# Patient Record
Sex: MALE | Race: BLACK OR AFRICAN AMERICAN | NOT HISPANIC OR LATINO | URBAN - METROPOLITAN AREA
[De-identification: names, ages, dates, MRNs, and addresses within clinical notes are randomized per-mention and may not be internally consistent; named-entity substitution may affect disease eponyms.]

---

## 2017-04-01 ENCOUNTER — EMERGENCY (EMERGENCY)
Facility: HOSPITAL | Age: 37
LOS: 1 days | Discharge: HOME | End: 2017-04-01
Attending: EMERGENCY MEDICINE
Payer: COMMERCIAL

## 2017-04-01 VITALS — TEMPERATURE: 98 F | HEART RATE: 69 BPM | OXYGEN SATURATION: 100 % | RESPIRATION RATE: 16 BRPM

## 2017-04-01 DIAGNOSIS — R19.7 DIARRHEA, UNSPECIFIED: ICD-10-CM

## 2017-04-01 DIAGNOSIS — R10.9 UNSPECIFIED ABDOMINAL PAIN: ICD-10-CM

## 2017-04-01 LAB
ALBUMIN SERPL ELPH-MCNC: 3.9 G/DL — SIGNIFICANT CHANGE UP (ref 3.3–5.2)
ALP SERPL-CCNC: 60 U/L — SIGNIFICANT CHANGE UP (ref 40–120)
ALT FLD-CCNC: 18 U/L — SIGNIFICANT CHANGE UP
ANION GAP SERPL CALC-SCNC: 8 MMOL/L — SIGNIFICANT CHANGE UP (ref 5–17)
AST SERPL-CCNC: 20 U/L — SIGNIFICANT CHANGE UP
BASOPHILS # BLD AUTO: 0.1 K/UL — SIGNIFICANT CHANGE UP (ref 0–0.2)
BASOPHILS NFR BLD AUTO: 1.9 % — SIGNIFICANT CHANGE UP (ref 0–2)
BILIRUB SERPL-MCNC: 0.2 MG/DL — LOW (ref 0.4–2)
BUN SERPL-MCNC: 14 MG/DL — SIGNIFICANT CHANGE UP (ref 8–20)
CALCIUM SERPL-MCNC: 9 MG/DL — SIGNIFICANT CHANGE UP (ref 8.6–10.2)
CHLORIDE SERPL-SCNC: 101 MMOL/L — SIGNIFICANT CHANGE UP (ref 98–107)
CO2 SERPL-SCNC: 30 MMOL/L — HIGH (ref 22–29)
CREAT SERPL-MCNC: 0.91 MG/DL — SIGNIFICANT CHANGE UP (ref 0.5–1.3)
EOSINOPHIL # BLD AUTO: 0.1 K/UL — SIGNIFICANT CHANGE UP (ref 0–0.5)
EOSINOPHIL NFR BLD AUTO: 2.1 % — SIGNIFICANT CHANGE UP (ref 0–5)
GLUCOSE SERPL-MCNC: 94 MG/DL — SIGNIFICANT CHANGE UP (ref 70–115)
HCT VFR BLD CALC: 40.4 % — LOW (ref 42–52)
HGB BLD-MCNC: 13.8 G/DL — LOW (ref 14–18)
LIDOCAIN IGE QN: 30 U/L — SIGNIFICANT CHANGE UP (ref 22–51)
LYMPHOCYTES # BLD AUTO: 2.1 K/UL — SIGNIFICANT CHANGE UP (ref 1–4.8)
LYMPHOCYTES # BLD AUTO: 40.2 % — SIGNIFICANT CHANGE UP (ref 20–55)
MCHC RBC-ENTMCNC: 31.1 PG — HIGH (ref 27–31)
MCHC RBC-ENTMCNC: 34.2 G/DL — SIGNIFICANT CHANGE UP (ref 32–36)
MCV RBC AUTO: 91 FL — SIGNIFICANT CHANGE UP (ref 80–94)
MONOCYTES # BLD AUTO: 0.8 K/UL — SIGNIFICANT CHANGE UP (ref 0–0.8)
MONOCYTES NFR BLD AUTO: 15.2 % — HIGH (ref 3–10)
NEUTROPHILS # BLD AUTO: 2.1 K/UL — SIGNIFICANT CHANGE UP (ref 1.8–8)
NEUTROPHILS NFR BLD AUTO: 40 % — SIGNIFICANT CHANGE UP (ref 37–73)
PLATELET # BLD AUTO: 300 K/UL — SIGNIFICANT CHANGE UP (ref 150–400)
POTASSIUM SERPL-MCNC: 4.5 MMOL/L — SIGNIFICANT CHANGE UP (ref 3.5–5.3)
POTASSIUM SERPL-SCNC: 4.5 MMOL/L — SIGNIFICANT CHANGE UP (ref 3.5–5.3)
PROT SERPL-MCNC: 8.3 G/DL — SIGNIFICANT CHANGE UP (ref 6.6–8.7)
RBC # BLD: 4.44 M/UL — LOW (ref 4.6–6.2)
RBC # FLD: 14.6 % — SIGNIFICANT CHANGE UP (ref 11–15.6)
SODIUM SERPL-SCNC: 139 MMOL/L — SIGNIFICANT CHANGE UP (ref 135–145)
WBC # BLD: 5.3 K/UL — SIGNIFICANT CHANGE UP (ref 4.8–10.8)
WBC # FLD AUTO: 5.3 K/UL — SIGNIFICANT CHANGE UP (ref 4.8–10.8)

## 2017-04-01 PROCEDURE — 74177 CT ABD & PELVIS W/CONTRAST: CPT

## 2017-04-01 PROCEDURE — 99284 EMERGENCY DEPT VISIT MOD MDM: CPT

## 2017-04-01 PROCEDURE — 99284 EMERGENCY DEPT VISIT MOD MDM: CPT | Mod: 25

## 2017-04-01 PROCEDURE — 74177 CT ABD & PELVIS W/CONTRAST: CPT | Mod: 26

## 2017-04-01 PROCEDURE — 80053 COMPREHEN METABOLIC PANEL: CPT

## 2017-04-01 PROCEDURE — 85027 COMPLETE CBC AUTOMATED: CPT

## 2017-04-01 PROCEDURE — 83690 ASSAY OF LIPASE: CPT

## 2017-04-01 RX ORDER — SODIUM CHLORIDE 9 MG/ML
1000 INJECTION INTRAMUSCULAR; INTRAVENOUS; SUBCUTANEOUS
Qty: 0 | Refills: 0 | Status: DISCONTINUED | OUTPATIENT
Start: 2017-04-01 | End: 2017-04-05

## 2017-04-01 RX ADMIN — SODIUM CHLORIDE 500 MILLILITER(S): 9 INJECTION INTRAMUSCULAR; INTRAVENOUS; SUBCUTANEOUS at 15:29

## 2017-04-01 NOTE — ED STATDOCS - PROGRESS NOTE DETAILS
35 y/o non-verbal M presents complaining of intermittent diarrhea x 2 weeks and abdominal pain. Patient was seen by PMD for same and sent to ER for US. Will place in main ED for further evaluation 37 y/o M with down syndrome presents with father complaining of intermittent diarrhea x 2 weeks and abdominal pain. Patient was seen by PMD for same and sent to ER for US. Will place in main ED for further evaluation

## 2017-04-01 NOTE — ED ADULT NURSE NOTE - OBJECTIVE STATEMENT
Pt presents to ED with hx of Downs Syndrome, awake and alert with father at bedside. Father reports pt with diffuse abdominal pain with associated diarrhea x2 weeks. Father reports family at home being sick with cough, congestion. Pt denies any fever or chills. Denies any CP or SOB, respirations even and unlabored. Denies any bloody stools or hematuria. Pt in no apparent distress. will continue to monitor and reassess.

## 2017-04-01 NOTE — ED PROVIDER NOTE - PROGRESS NOTE DETAILS
Pt has had no further diarrhea here. Will need to f/u with PMD or GI for stool studies. Abd nontender.

## 2017-04-01 NOTE — ED PROVIDER NOTE - OBJECTIVE STATEMENT
Pt with hx of Down's Syndrome with 2 week hx of diarrhea. No fever or vomiting. ? abdominal pain. No recent antibiotics.

## 2017-04-01 NOTE — ED PROVIDER NOTE - NS ED ROS FT
Review of Systems:  	•	CONSTITUTIONAL : no weight loss  	•	SKIN : no rash  	•	HEMATOLOGIC : no petechia, no bruising  	•	EYES : no eye pain, no blurred vision  	•	ENT : no change in hearing, no sore throat  	•	RESPIRATORY : no shortness of breath, no cough  	•	CARDIAC : no chest pain, no palpitations  	•	GI : + diarrhea  	•	MUSCULOSKELETAL : no joint paint, no swelling, no redness  	•	NEUROLOGIC : MR

## 2017-12-20 ENCOUNTER — EMERGENCY (EMERGENCY)
Facility: HOSPITAL | Age: 37
LOS: 1 days | Discharge: HOME | End: 2017-12-20
Attending: EMERGENCY MEDICINE | Admitting: EMERGENCY MEDICINE
Payer: MEDICARE

## 2017-12-20 VITALS
SYSTOLIC BLOOD PRESSURE: 130 MMHG | RESPIRATION RATE: 18 BRPM | TEMPERATURE: 98 F | OXYGEN SATURATION: 95 % | HEART RATE: 120 BPM | DIASTOLIC BLOOD PRESSURE: 90 MMHG

## 2017-12-20 VITALS — HEIGHT: 60 IN | WEIGHT: 207.9 LBS

## 2017-12-20 LAB
ALBUMIN SERPL ELPH-MCNC: 3.8 G/DL — SIGNIFICANT CHANGE UP (ref 3.3–5.2)
ALP SERPL-CCNC: 68 U/L — SIGNIFICANT CHANGE UP (ref 40–120)
ALT FLD-CCNC: 39 U/L — SIGNIFICANT CHANGE UP
ANION GAP SERPL CALC-SCNC: 17 MMOL/L — SIGNIFICANT CHANGE UP (ref 5–17)
AST SERPL-CCNC: 27 U/L — SIGNIFICANT CHANGE UP
BASOPHILS NFR BLD AUTO: 1 % — SIGNIFICANT CHANGE UP (ref 0–2)
BILIRUB SERPL-MCNC: 0.3 MG/DL — LOW (ref 0.4–2)
BUN SERPL-MCNC: 19 MG/DL — SIGNIFICANT CHANGE UP (ref 8–20)
CALCIUM SERPL-MCNC: 9 MG/DL — SIGNIFICANT CHANGE UP (ref 8.6–10.2)
CHLORIDE SERPL-SCNC: 101 MMOL/L — SIGNIFICANT CHANGE UP (ref 98–107)
CO2 SERPL-SCNC: 23 MMOL/L — SIGNIFICANT CHANGE UP (ref 22–29)
CREAT SERPL-MCNC: 0.98 MG/DL — SIGNIFICANT CHANGE UP (ref 0.5–1.3)
GLUCOSE SERPL-MCNC: 93 MG/DL — SIGNIFICANT CHANGE UP (ref 70–115)
HCT VFR BLD CALC: 44.3 % — SIGNIFICANT CHANGE UP (ref 42–52)
HGB BLD-MCNC: 15.4 G/DL — SIGNIFICANT CHANGE UP (ref 14–18)
LIDOCAIN IGE QN: 21 U/L — LOW (ref 22–51)
LYMPHOCYTES # BLD AUTO: 5 % — LOW (ref 20–55)
MCHC RBC-ENTMCNC: 32.1 PG — HIGH (ref 27–31)
MCHC RBC-ENTMCNC: 34.8 G/DL — SIGNIFICANT CHANGE UP (ref 32–36)
MCV RBC AUTO: 92.3 FL — SIGNIFICANT CHANGE UP (ref 80–94)
MONOCYTES NFR BLD AUTO: 8 % — SIGNIFICANT CHANGE UP (ref 3–10)
NEUTROPHILS NFR BLD AUTO: 86 % — HIGH (ref 37–73)
PLAT MORPH BLD: NORMAL — SIGNIFICANT CHANGE UP
PLATELET # BLD AUTO: 281 K/UL — SIGNIFICANT CHANGE UP (ref 150–400)
POTASSIUM SERPL-MCNC: 4.4 MMOL/L — SIGNIFICANT CHANGE UP (ref 3.5–5.3)
POTASSIUM SERPL-SCNC: 4.4 MMOL/L — SIGNIFICANT CHANGE UP (ref 3.5–5.3)
PROT SERPL-MCNC: 8.5 G/DL — SIGNIFICANT CHANGE UP (ref 6.6–8.7)
RBC # BLD: 4.8 M/UL — SIGNIFICANT CHANGE UP (ref 4.6–6.2)
RBC # FLD: 14 % — SIGNIFICANT CHANGE UP (ref 11–15.6)
RBC BLD AUTO: NORMAL — SIGNIFICANT CHANGE UP
SODIUM SERPL-SCNC: 141 MMOL/L — SIGNIFICANT CHANGE UP (ref 135–145)
WBC # BLD: 12.9 K/UL — HIGH (ref 4.8–10.8)
WBC # FLD AUTO: 12.9 K/UL — HIGH (ref 4.8–10.8)

## 2017-12-20 PROCEDURE — 84100 ASSAY OF PHOSPHORUS: CPT

## 2017-12-20 PROCEDURE — 83690 ASSAY OF LIPASE: CPT

## 2017-12-20 PROCEDURE — 99284 EMERGENCY DEPT VISIT MOD MDM: CPT | Mod: 25

## 2017-12-20 PROCEDURE — 99284 EMERGENCY DEPT VISIT MOD MDM: CPT

## 2017-12-20 PROCEDURE — 74177 CT ABD & PELVIS W/CONTRAST: CPT

## 2017-12-20 PROCEDURE — 74177 CT ABD & PELVIS W/CONTRAST: CPT | Mod: 26

## 2017-12-20 PROCEDURE — 80053 COMPREHEN METABOLIC PANEL: CPT

## 2017-12-20 PROCEDURE — 96375 TX/PRO/DX INJ NEW DRUG ADDON: CPT

## 2017-12-20 PROCEDURE — 96374 THER/PROPH/DIAG INJ IV PUSH: CPT | Mod: XU

## 2017-12-20 PROCEDURE — 85027 COMPLETE CBC AUTOMATED: CPT

## 2017-12-20 PROCEDURE — 83735 ASSAY OF MAGNESIUM: CPT

## 2017-12-20 PROCEDURE — 36415 COLL VENOUS BLD VENIPUNCTURE: CPT

## 2017-12-20 RX ORDER — SODIUM CHLORIDE 9 MG/ML
3 INJECTION INTRAMUSCULAR; INTRAVENOUS; SUBCUTANEOUS ONCE
Qty: 0 | Refills: 0 | Status: COMPLETED | OUTPATIENT
Start: 2017-12-20 | End: 2017-12-20

## 2017-12-20 RX ORDER — ONDANSETRON 8 MG/1
4 TABLET, FILM COATED ORAL ONCE
Qty: 0 | Refills: 0 | Status: COMPLETED | OUTPATIENT
Start: 2017-12-20 | End: 2017-12-20

## 2017-12-20 RX ORDER — SODIUM CHLORIDE 9 MG/ML
1000 INJECTION INTRAMUSCULAR; INTRAVENOUS; SUBCUTANEOUS ONCE
Qty: 0 | Refills: 0 | Status: COMPLETED | OUTPATIENT
Start: 2017-12-20 | End: 2017-12-20

## 2017-12-20 RX ORDER — FAMOTIDINE 10 MG/ML
20 INJECTION INTRAVENOUS ONCE
Qty: 0 | Refills: 0 | Status: COMPLETED | OUTPATIENT
Start: 2017-12-20 | End: 2017-12-20

## 2017-12-20 RX ADMIN — FAMOTIDINE 20 MILLIGRAM(S): 10 INJECTION INTRAVENOUS at 21:13

## 2017-12-20 RX ADMIN — SODIUM CHLORIDE 1000 MILLILITER(S): 9 INJECTION INTRAMUSCULAR; INTRAVENOUS; SUBCUTANEOUS at 21:13

## 2017-12-20 RX ADMIN — SODIUM CHLORIDE 3 MILLILITER(S): 9 INJECTION INTRAMUSCULAR; INTRAVENOUS; SUBCUTANEOUS at 21:13

## 2017-12-20 RX ADMIN — ONDANSETRON 4 MILLIGRAM(S): 8 TABLET, FILM COATED ORAL at 21:13

## 2017-12-20 NOTE — ED ADULT NURSE NOTE - OBJECTIVE STATEMENT
Assumed pt care @ 2100. Pt received sitting on stretcher in NAD. Pt nonverbal as per father. Pt is here due to diarrhea and vomiting since today. Father states he was at his program and a few other people have the same symptoms, "he has the flu". Dad does state he got flu shot this year. Nonverbal indicator of abd pain. Dad unsure about fever. Lungs CTA, RR even unlabored. Abd soft & tender, + bowel sounds x 4quads.Skin warm, dry, color appropriate for age and race.

## 2017-12-20 NOTE — ED STATDOCS - PROGRESS NOTE DETAILS
Pt is 36 y/o M presents to ED c/o of constant ABD pain, diarrhea and vomiting with father at bedside. Per father, Pt has down syndrome and HLD. Potential sick contacts in care program. Will transfer to main for further evaluation.

## 2017-12-21 NOTE — ED PROVIDER NOTE - MEDICAL DECISION MAKING DETAILS
return to ed for intractable pain fever or unable to toelrat epo fluids father agrees to plan of care

## 2017-12-21 NOTE — ED PROVIDER NOTE - OBJECTIVE STATEMENT
36 y/o male with a hx of down syndrome presents to the ED BIB father with c/o abd pain. Father notes that pt has had multiple episodes of vomiting and diarrhea. Father states that pt's diarrhea is non-bloody and non-bilious. HPI and ROS is limited 2ndary to pt being non-verbal at baseline. No further complaints at this time.

## 2018-01-26 ENCOUNTER — EMERGENCY (EMERGENCY)
Facility: HOSPITAL | Age: 38
LOS: 1 days | Discharge: HOME | End: 2018-01-26
Attending: EMERGENCY MEDICINE
Payer: MEDICARE

## 2018-01-26 VITALS
HEART RATE: 59 BPM | WEIGHT: 209 LBS | TEMPERATURE: 98 F | RESPIRATION RATE: 20 BRPM | DIASTOLIC BLOOD PRESSURE: 96 MMHG | SYSTOLIC BLOOD PRESSURE: 138 MMHG | OXYGEN SATURATION: 100 %

## 2018-01-26 LAB
ALBUMIN SERPL ELPH-MCNC: 3.6 G/DL — SIGNIFICANT CHANGE UP (ref 3.3–5.2)
ALP SERPL-CCNC: 60 U/L — SIGNIFICANT CHANGE UP (ref 40–120)
ALT FLD-CCNC: 21 U/L — SIGNIFICANT CHANGE UP
ANION GAP SERPL CALC-SCNC: 9 MMOL/L — SIGNIFICANT CHANGE UP (ref 5–17)
AST SERPL-CCNC: 18 U/L — SIGNIFICANT CHANGE UP
BASOPHILS # BLD AUTO: 0.1 K/UL — SIGNIFICANT CHANGE UP (ref 0–0.2)
BASOPHILS NFR BLD AUTO: 1.6 % — SIGNIFICANT CHANGE UP (ref 0–2)
BILIRUB SERPL-MCNC: 0.3 MG/DL — LOW (ref 0.4–2)
BUN SERPL-MCNC: 14 MG/DL — SIGNIFICANT CHANGE UP (ref 8–20)
CALCIUM SERPL-MCNC: 9.1 MG/DL — SIGNIFICANT CHANGE UP (ref 8.6–10.2)
CHLORIDE SERPL-SCNC: 102 MMOL/L — SIGNIFICANT CHANGE UP (ref 98–107)
CO2 SERPL-SCNC: 27 MMOL/L — SIGNIFICANT CHANGE UP (ref 22–29)
CREAT SERPL-MCNC: 0.93 MG/DL — SIGNIFICANT CHANGE UP (ref 0.5–1.3)
EOSINOPHIL # BLD AUTO: 0.1 K/UL — SIGNIFICANT CHANGE UP (ref 0–0.5)
EOSINOPHIL NFR BLD AUTO: 1.4 % — SIGNIFICANT CHANGE UP (ref 0–5)
GLUCOSE SERPL-MCNC: 95 MG/DL — SIGNIFICANT CHANGE UP (ref 70–115)
HCT VFR BLD CALC: 39.9 % — LOW (ref 42–52)
HGB BLD-MCNC: 13.5 G/DL — LOW (ref 14–18)
LIDOCAIN IGE QN: 36 U/L — SIGNIFICANT CHANGE UP (ref 22–51)
LYMPHOCYTES # BLD AUTO: 2.6 K/UL — SIGNIFICANT CHANGE UP (ref 1–4.8)
LYMPHOCYTES # BLD AUTO: 45.8 % — SIGNIFICANT CHANGE UP (ref 20–55)
MCHC RBC-ENTMCNC: 31.5 PG — HIGH (ref 27–31)
MCHC RBC-ENTMCNC: 33.8 G/DL — SIGNIFICANT CHANGE UP (ref 32–36)
MCV RBC AUTO: 93 FL — SIGNIFICANT CHANGE UP (ref 80–94)
MONOCYTES # BLD AUTO: 0.6 K/UL — SIGNIFICANT CHANGE UP (ref 0–0.8)
MONOCYTES NFR BLD AUTO: 11.5 % — HIGH (ref 3–10)
NEUTROPHILS # BLD AUTO: 2.2 K/UL — SIGNIFICANT CHANGE UP (ref 1.8–8)
NEUTROPHILS NFR BLD AUTO: 39.2 % — SIGNIFICANT CHANGE UP (ref 37–73)
PLATELET # BLD AUTO: 268 K/UL — SIGNIFICANT CHANGE UP (ref 150–400)
POTASSIUM SERPL-MCNC: 4.4 MMOL/L — SIGNIFICANT CHANGE UP (ref 3.5–5.3)
POTASSIUM SERPL-SCNC: 4.4 MMOL/L — SIGNIFICANT CHANGE UP (ref 3.5–5.3)
PROT SERPL-MCNC: 7.3 G/DL — SIGNIFICANT CHANGE UP (ref 6.6–8.7)
RBC # BLD: 4.29 M/UL — LOW (ref 4.6–6.2)
RBC # FLD: 14 % — SIGNIFICANT CHANGE UP (ref 11–15.6)
SODIUM SERPL-SCNC: 138 MMOL/L — SIGNIFICANT CHANGE UP (ref 135–145)
WBC # BLD: 5.6 K/UL — SIGNIFICANT CHANGE UP (ref 4.8–10.8)
WBC # FLD AUTO: 5.6 K/UL — SIGNIFICANT CHANGE UP (ref 4.8–10.8)

## 2018-01-26 PROCEDURE — 83690 ASSAY OF LIPASE: CPT

## 2018-01-26 PROCEDURE — 85027 COMPLETE CBC AUTOMATED: CPT

## 2018-01-26 PROCEDURE — 99284 EMERGENCY DEPT VISIT MOD MDM: CPT

## 2018-01-26 PROCEDURE — 80053 COMPREHEN METABOLIC PANEL: CPT

## 2018-01-26 PROCEDURE — 99283 EMERGENCY DEPT VISIT LOW MDM: CPT

## 2018-01-26 PROCEDURE — 36415 COLL VENOUS BLD VENIPUNCTURE: CPT

## 2018-01-26 RX ORDER — LIDOCAINE 4 G/100G
10 CREAM TOPICAL ONCE
Qty: 0 | Refills: 0 | Status: COMPLETED | OUTPATIENT
Start: 2018-01-26 | End: 2018-01-26

## 2018-01-26 RX ADMIN — LIDOCAINE 10 MILLILITER(S): 4 CREAM TOPICAL at 22:04

## 2018-01-26 RX ADMIN — Medication 30 MILLILITER(S): at 22:04

## 2018-01-26 NOTE — ED ADULT NURSE REASSESSMENT NOTE - NS ED NURSE REASSESS COMMENT FT1
Pt. received at 2330, Hx of Down Syndrome, nonverbal at baseline, as per father pt. acting appropriately. Had one episode of bloody stool today. no other complications at this time. informed of plan of care

## 2018-01-26 NOTE — ED PROVIDER NOTE - MEDICAL DECISION MAKING DETAILS
in nad acting apporpiate per marquez t bedside likely hemrrohoidal no signs actue bleeding or caogulopathy return to ed for intractable abd pain fevers or any overall worsening

## 2018-01-26 NOTE — ED PROVIDER NOTE - OBJECTIVE STATEMENT
38 y/o M pt with hx of down syndrome (nonverbal) presents to ED with dad s/p 1 episode of blood diarrhea today. Per dad pt is acting appropriately. No blood thinners. Per dad no hematemesis, vomiting, fevers, rash. No further complaints at this time.

## 2018-01-27 VITALS
OXYGEN SATURATION: 99 % | TEMPERATURE: 98 F | RESPIRATION RATE: 18 BRPM | SYSTOLIC BLOOD PRESSURE: 133 MMHG | DIASTOLIC BLOOD PRESSURE: 92 MMHG | HEART RATE: 62 BPM

## 2020-04-02 ENCOUNTER — INPATIENT (INPATIENT)
Facility: HOSPITAL | Age: 40
LOS: 7 days | Discharge: HOME | DRG: 177 | End: 2020-04-10
Attending: INTERNAL MEDICINE | Admitting: HOSPITALIST
Payer: MEDICARE

## 2020-04-02 VITALS
RESPIRATION RATE: 22 BRPM | WEIGHT: 199.96 LBS | HEIGHT: 60 IN | SYSTOLIC BLOOD PRESSURE: 104 MMHG | DIASTOLIC BLOOD PRESSURE: 68 MMHG | HEART RATE: 114 BPM | OXYGEN SATURATION: 90 % | TEMPERATURE: 101 F

## 2020-04-02 DIAGNOSIS — B34.9 VIRAL INFECTION, UNSPECIFIED: ICD-10-CM

## 2020-04-02 LAB
ALBUMIN SERPL ELPH-MCNC: 3.5 G/DL — SIGNIFICANT CHANGE UP (ref 3.3–5.2)
ALP SERPL-CCNC: 47 U/L — SIGNIFICANT CHANGE UP (ref 40–120)
ALT FLD-CCNC: 21 U/L — SIGNIFICANT CHANGE UP
ANION GAP SERPL CALC-SCNC: 15 MMOL/L — SIGNIFICANT CHANGE UP (ref 5–17)
AST SERPL-CCNC: 44 U/L — HIGH
BASOPHILS # BLD AUTO: 0.04 K/UL — SIGNIFICANT CHANGE UP (ref 0–0.2)
BASOPHILS NFR BLD AUTO: 0.9 % — SIGNIFICANT CHANGE UP (ref 0–2)
BILIRUB SERPL-MCNC: 0.2 MG/DL — LOW (ref 0.4–2)
BUN SERPL-MCNC: 11 MG/DL — SIGNIFICANT CHANGE UP (ref 8–20)
CALCIUM SERPL-MCNC: 8.3 MG/DL — LOW (ref 8.6–10.2)
CHLORIDE SERPL-SCNC: 101 MMOL/L — SIGNIFICANT CHANGE UP (ref 98–107)
CO2 SERPL-SCNC: 25 MMOL/L — SIGNIFICANT CHANGE UP (ref 22–29)
CREAT SERPL-MCNC: 1.22 MG/DL — SIGNIFICANT CHANGE UP (ref 0.5–1.3)
CRP SERPL-MCNC: 6.4 MG/DL — HIGH (ref 0–0.4)
EOSINOPHIL # BLD AUTO: 0 K/UL — SIGNIFICANT CHANGE UP (ref 0–0.5)
EOSINOPHIL NFR BLD AUTO: 0 % — SIGNIFICANT CHANGE UP (ref 0–6)
FERRITIN SERPL-MCNC: 744 NG/ML — HIGH (ref 30–400)
GIANT PLATELETS BLD QL SMEAR: PRESENT — SIGNIFICANT CHANGE UP
GLUCOSE SERPL-MCNC: 104 MG/DL — HIGH (ref 70–99)
HCT VFR BLD CALC: 44.2 % — SIGNIFICANT CHANGE UP (ref 39–50)
HGB BLD-MCNC: 14.5 G/DL — SIGNIFICANT CHANGE UP (ref 13–17)
LDH SERPL L TO P-CCNC: 383 U/L — HIGH (ref 98–192)
LYMPHOCYTES # BLD AUTO: 0.95 K/UL — LOW (ref 1–3.3)
LYMPHOCYTES # BLD AUTO: 21.7 % — SIGNIFICANT CHANGE UP (ref 13–44)
MANUAL SMEAR VERIFICATION: SIGNIFICANT CHANGE UP
MCHC RBC-ENTMCNC: 30.5 PG — SIGNIFICANT CHANGE UP (ref 27–34)
MCHC RBC-ENTMCNC: 32.8 GM/DL — SIGNIFICANT CHANGE UP (ref 32–36)
MCV RBC AUTO: 93.1 FL — SIGNIFICANT CHANGE UP (ref 80–100)
MONOCYTES # BLD AUTO: 0.3 K/UL — SIGNIFICANT CHANGE UP (ref 0–0.9)
MONOCYTES NFR BLD AUTO: 6.9 % — SIGNIFICANT CHANGE UP (ref 2–14)
NEUTROPHILS # BLD AUTO: 2.91 K/UL — SIGNIFICANT CHANGE UP (ref 1.8–7.4)
NEUTROPHILS NFR BLD AUTO: 52.2 % — SIGNIFICANT CHANGE UP (ref 43–77)
NEUTS BAND # BLD: 13.9 % — HIGH (ref 0–8)
NRBC # BLD: 1 /100 — HIGH (ref 0–0)
PLAT MORPH BLD: NORMAL — SIGNIFICANT CHANGE UP
PLATELET # BLD AUTO: 228 K/UL — SIGNIFICANT CHANGE UP (ref 150–400)
POLYCHROMASIA BLD QL SMEAR: SLIGHT — SIGNIFICANT CHANGE UP
POTASSIUM SERPL-MCNC: 4.2 MMOL/L — SIGNIFICANT CHANGE UP (ref 3.5–5.3)
POTASSIUM SERPL-SCNC: 4.2 MMOL/L — SIGNIFICANT CHANGE UP (ref 3.5–5.3)
PROCALCITONIN SERPL-MCNC: 0.07 NG/ML — SIGNIFICANT CHANGE UP (ref 0.02–0.1)
PROMYELOCYTES # FLD: 0.9 % — HIGH (ref 0–0)
PROT SERPL-MCNC: 7.6 G/DL — SIGNIFICANT CHANGE UP (ref 6.6–8.7)
RBC # BLD: 4.75 M/UL — SIGNIFICANT CHANGE UP (ref 4.2–5.8)
RBC # FLD: 14.1 % — SIGNIFICANT CHANGE UP (ref 10.3–14.5)
RBC BLD AUTO: ABNORMAL
SARS-COV-2 RNA SPEC QL NAA+PROBE: DETECTED
SODIUM SERPL-SCNC: 141 MMOL/L — SIGNIFICANT CHANGE UP (ref 135–145)
VARIANT LYMPHS # BLD: 3.5 % — SIGNIFICANT CHANGE UP (ref 0–6)
WBC # BLD: 4.4 K/UL — SIGNIFICANT CHANGE UP (ref 3.8–10.5)
WBC # FLD AUTO: 4.4 K/UL — SIGNIFICANT CHANGE UP (ref 3.8–10.5)

## 2020-04-02 PROCEDURE — 99285 EMERGENCY DEPT VISIT HI MDM: CPT

## 2020-04-02 PROCEDURE — 99223 1ST HOSP IP/OBS HIGH 75: CPT | Mod: AI

## 2020-04-02 PROCEDURE — 93010 ELECTROCARDIOGRAM REPORT: CPT

## 2020-04-02 PROCEDURE — 71045 X-RAY EXAM CHEST 1 VIEW: CPT | Mod: 26

## 2020-04-02 RX ORDER — ACETAMINOPHEN 500 MG
650 TABLET ORAL ONCE
Refills: 0 | Status: COMPLETED | OUTPATIENT
Start: 2020-04-02 | End: 2020-04-02

## 2020-04-02 RX ORDER — ICOSAPENT ETHYL 500 MG/1
2 CAPSULE, LIQUID FILLED ORAL
Qty: 0 | Refills: 0 | DISCHARGE

## 2020-04-02 RX ORDER — HYDROXYCHLOROQUINE SULFATE 200 MG
200 TABLET ORAL EVERY 12 HOURS
Refills: 0 | Status: COMPLETED | OUTPATIENT
Start: 2020-04-03 | End: 2020-04-07

## 2020-04-02 RX ORDER — ZOLPIDEM TARTRATE 10 MG/1
5 TABLET ORAL AT BEDTIME
Refills: 0 | Status: DISCONTINUED | OUTPATIENT
Start: 2020-04-02 | End: 2020-04-04

## 2020-04-02 RX ORDER — SODIUM CHLORIDE 9 MG/ML
500 INJECTION INTRAMUSCULAR; INTRAVENOUS; SUBCUTANEOUS ONCE
Refills: 0 | Status: COMPLETED | OUTPATIENT
Start: 2020-04-02 | End: 2020-04-02

## 2020-04-02 RX ORDER — OMEPRAZOLE 10 MG/1
1 CAPSULE, DELAYED RELEASE ORAL
Qty: 0 | Refills: 0 | DISCHARGE

## 2020-04-02 RX ORDER — ZOLPIDEM TARTRATE 10 MG/1
1 TABLET ORAL
Qty: 0 | Refills: 0 | DISCHARGE

## 2020-04-02 RX ORDER — HYDROXYCHLOROQUINE SULFATE 200 MG
TABLET ORAL
Refills: 0 | Status: DISCONTINUED | OUTPATIENT
Start: 2020-04-02 | End: 2020-04-02

## 2020-04-02 RX ORDER — ESCITALOPRAM OXALATE 10 MG/1
0.5 TABLET, FILM COATED ORAL
Qty: 0 | Refills: 0 | DISCHARGE

## 2020-04-02 RX ORDER — AZITHROMYCIN 500 MG/1
500 TABLET, FILM COATED ORAL ONCE
Refills: 0 | Status: COMPLETED | OUTPATIENT
Start: 2020-04-02 | End: 2020-04-02

## 2020-04-02 RX ORDER — HYDROXYCHLOROQUINE SULFATE 200 MG
400 TABLET ORAL EVERY 12 HOURS
Refills: 0 | Status: COMPLETED | OUTPATIENT
Start: 2020-04-02 | End: 2020-04-03

## 2020-04-02 RX ORDER — HYDROXYCHLOROQUINE SULFATE 200 MG
TABLET ORAL
Refills: 0 | Status: COMPLETED | OUTPATIENT
Start: 2020-04-02 | End: 2020-04-07

## 2020-04-02 RX ORDER — ACETAMINOPHEN 500 MG
650 TABLET ORAL EVERY 6 HOURS
Refills: 0 | Status: DISCONTINUED | OUTPATIENT
Start: 2020-04-02 | End: 2020-04-10

## 2020-04-02 RX ORDER — ASENAPINE MALEATE 10 MG/1
2 TABLET SUBLINGUAL
Qty: 0 | Refills: 0 | DISCHARGE

## 2020-04-02 RX ORDER — PANTOPRAZOLE SODIUM 20 MG/1
40 TABLET, DELAYED RELEASE ORAL
Refills: 0 | Status: DISCONTINUED | OUTPATIENT
Start: 2020-04-02 | End: 2020-04-10

## 2020-04-02 RX ORDER — ESCITALOPRAM OXALATE 10 MG/1
10 TABLET, FILM COATED ORAL DAILY
Refills: 0 | Status: DISCONTINUED | OUTPATIENT
Start: 2020-04-02 | End: 2020-04-10

## 2020-04-02 RX ORDER — ENOXAPARIN SODIUM 100 MG/ML
40 INJECTION SUBCUTANEOUS DAILY
Refills: 0 | Status: DISCONTINUED | OUTPATIENT
Start: 2020-04-02 | End: 2020-04-10

## 2020-04-02 RX ADMIN — Medication 650 MILLIGRAM(S): at 21:30

## 2020-04-02 RX ADMIN — AZITHROMYCIN 255 MILLIGRAM(S): 500 TABLET, FILM COATED ORAL at 16:45

## 2020-04-02 RX ADMIN — SODIUM CHLORIDE 500 MILLILITER(S): 9 INJECTION INTRAMUSCULAR; INTRAVENOUS; SUBCUTANEOUS at 12:41

## 2020-04-02 RX ADMIN — Medication 650 MILLIGRAM(S): at 12:42

## 2020-04-02 RX ADMIN — Medication 400 MILLIGRAM(S): at 16:45

## 2020-04-02 NOTE — ED STATDOCS - OBJECTIVE STATEMENT
38 y/o M pt with significant PMHx of Down's Syndrome presents to the ED c/o flu like symptoms onset 4 days ago. Father reports that 4 days ago, his symptoms began with a fever and cough, before progressing to diarrhea and decreased appetite. Father states that the patient went to his PMD today regarding his symptoms, and was told that he is probably covid positive, and prompted to come to the ED. Non smoker. No further acute complaints at this time.   *History obtained by Father, who is primary caretaker of patient*

## 2020-04-02 NOTE — H&P ADULT - NSHPSOCIALHISTORY_GEN_ALL_CORE
Denied tobacco, alcohol, or illicit drug use  Lives with his father and brother    Family history: Parents without history of pulmonary disease

## 2020-04-02 NOTE — H&P ADULT - ASSESSMENT
39M with a history of Down's syndrome who presented with fevers, dyspnea, and cough. Chest Xray was noted to have bilateral infiltrates and COVID-19 was noted to be positive.    Viral pneumonia / COVID-19 positive - Discussed with the patient's father. Isolation precautions.    Acute hypoxic respiratory failure - Supplemental oxygen with titration as needed. Pulse oxymetry monitoring.    Down's syndrome - On escitalopram. 39M with a history of Down's syndrome who presented with fevers, dyspnea, and cough. Chest Xray was noted to have bilateral infiltrates and COVID-19 was noted to be positive.    Viral pneumonia / COVID-19 positive - Discussed with the patient's father. Isolation precautions.    Acute hypoxic respiratory failure - Supplemental oxygen with titration as needed. Pulse oxymetry monitoring.    Down's syndrome - On escitalopram.    Gastritis - On pantoprazole.    Hyperlipidemia - Pravastatin to be held for now.    Discussed with the patient's father at the bedside.

## 2020-04-02 NOTE — ED STATDOCS - CLINICAL SUMMARY MEDICAL DECISION MAKING FREE TEXT BOX
38 y/o M with down syndrome, cared for by father, p/w covid like symptoms. Will run blood work. On exam, patient is tachy, tachypneic, febrile. Will give tylenol and reassess. 38 y/o M with down syndrome, cared for by father, p/w covid like symptoms. fever chills cough diarrhea and decreased appetitie Will run blood work. On exam, patient is tachy, tachypneic, febrile. Will give tylenol small bolus iv fluids labs and reassess. 40 y/o M with down syndrome, cared for by father, p/w covid like symptoms. fever chills cough diarrhea and decreased appetitie Will run blood work. On exam, patient is tachy, tachypneic, febrile. Will give tylenol small bolus iv fluids labs and reassess.  elevated ldh elevated ferritin covid +  see other labs  admit Dr Hamilton

## 2020-04-02 NOTE — H&P ADULT - NSHPPHYSICALEXAM_GEN_ALL_CORE
Vital Signs Last 24 Hrs  T(C): 38.6 (02 Apr 2020 11:25), Max: 38.6 (02 Apr 2020 11:25)  T(F): 101.4 (02 Apr 2020 11:25), Max: 101.4 (02 Apr 2020 11:25)  HR: 114 (02 Apr 2020 11:25) (114 - 114)  BP: 104/68 (02 Apr 2020 11:25) (104/68 - 104/68)  BP(mean): --  RR: 22 (02 Apr 2020 11:25) (22 - 22)  SpO2: 90% (02 Apr 2020 11:25) (90% - 90%)    General appearance: No acute distress, Awake, Alert  HEENT: Normocephalic, Atraumatic, Conjunctiva clear, EOMI  Neck: Supple, No JVD, No tenderness  Lungs: Breath sound equal bilaterally, No wheezes, Mild rales  Cardiovascular: S1S2, Regular rhythm  Abdomen: Soft, Nontender, Nondistended, No guarding/rebound, Positive bowel sounds  Extremities: No clubbing, No cyanosis, No edema, No calf tenderness  Neuro: Strength equal bilaterally, No tremors  Psychiatric: Appropriate mood, Normal affect

## 2020-04-02 NOTE — H&P ADULT - HISTORY OF PRESENT ILLNESS
39M with a four day history of fever, cough, and diarrhea who was referred to the hospital by his physician for further evaluation. Information was obtained from the patient's father at the bedside. The patient's father reported that the only possible sick contact was the home health aid who comes to the house. He did not note any wheezing. The diarrhea was nonbloody and not associated with oral intake. He is unaware of any aggravating or relieving factors. The patient is without any history of pulmonary issues in the past. Additional history limited due to the patient's medical condition.

## 2020-04-02 NOTE — ED ADULT TRIAGE NOTE - CHIEF COMPLAINT QUOTE
patient with father - patient has down syndrome and was taken to primary and told he has the virus - patient with cough and complaining of a fever

## 2020-04-02 NOTE — ED ADULT NURSE NOTE - NSIMPLEMENTINTERV_GEN_ALL_ED
Implemented All Universal Safety Interventions:  [place] to call system. Call bell, personal items and telephone within reach. Instruct patient to call for assistance. Room bathroom lighting operational. Non-slip footwear when patient is off stretcher. Physically safe environment: no spills, clutter or unnecessary equipment. Stretcher in lowest position, wheels locked, appropriate side rails in place.

## 2020-04-03 LAB
ANION GAP SERPL CALC-SCNC: 14 MMOL/L — SIGNIFICANT CHANGE UP (ref 5–17)
BUN SERPL-MCNC: 10 MG/DL — SIGNIFICANT CHANGE UP (ref 8–20)
CALCIUM SERPL-MCNC: 8.8 MG/DL — SIGNIFICANT CHANGE UP (ref 8.6–10.2)
CHLORIDE SERPL-SCNC: 101 MMOL/L — SIGNIFICANT CHANGE UP (ref 98–107)
CO2 SERPL-SCNC: 25 MMOL/L — SIGNIFICANT CHANGE UP (ref 22–29)
CREAT SERPL-MCNC: 1.02 MG/DL — SIGNIFICANT CHANGE UP (ref 0.5–1.3)
GLUCOSE SERPL-MCNC: 97 MG/DL — SIGNIFICANT CHANGE UP (ref 70–99)
HCT VFR BLD CALC: 40.4 % — SIGNIFICANT CHANGE UP (ref 39–50)
HGB BLD-MCNC: 13.2 G/DL — SIGNIFICANT CHANGE UP (ref 13–17)
MCHC RBC-ENTMCNC: 30.6 PG — SIGNIFICANT CHANGE UP (ref 27–34)
MCHC RBC-ENTMCNC: 32.7 GM/DL — SIGNIFICANT CHANGE UP (ref 32–36)
MCV RBC AUTO: 93.7 FL — SIGNIFICANT CHANGE UP (ref 80–100)
PLATELET # BLD AUTO: 228 K/UL — SIGNIFICANT CHANGE UP (ref 150–400)
POTASSIUM SERPL-MCNC: 4.1 MMOL/L — SIGNIFICANT CHANGE UP (ref 3.5–5.3)
POTASSIUM SERPL-SCNC: 4.1 MMOL/L — SIGNIFICANT CHANGE UP (ref 3.5–5.3)
RBC # BLD: 4.31 M/UL — SIGNIFICANT CHANGE UP (ref 4.2–5.8)
RBC # FLD: 14.2 % — SIGNIFICANT CHANGE UP (ref 10.3–14.5)
SODIUM SERPL-SCNC: 140 MMOL/L — SIGNIFICANT CHANGE UP (ref 135–145)
WBC # BLD: 5.8 K/UL — SIGNIFICANT CHANGE UP (ref 3.8–10.5)
WBC # FLD AUTO: 5.8 K/UL — SIGNIFICANT CHANGE UP (ref 3.8–10.5)

## 2020-04-03 PROCEDURE — 99232 SBSQ HOSP IP/OBS MODERATE 35: CPT

## 2020-04-03 RX ADMIN — PANTOPRAZOLE SODIUM 40 MILLIGRAM(S): 20 TABLET, DELAYED RELEASE ORAL at 05:38

## 2020-04-03 RX ADMIN — Medication 650 MILLIGRAM(S): at 04:25

## 2020-04-03 RX ADMIN — Medication 400 MILLIGRAM(S): at 04:25

## 2020-04-03 RX ADMIN — Medication 200 MILLIGRAM(S): at 15:37

## 2020-04-03 RX ADMIN — ENOXAPARIN SODIUM 40 MILLIGRAM(S): 100 INJECTION SUBCUTANEOUS at 11:17

## 2020-04-03 RX ADMIN — ESCITALOPRAM OXALATE 10 MILLIGRAM(S): 10 TABLET, FILM COATED ORAL at 11:18

## 2020-04-03 RX ADMIN — Medication 650 MILLIGRAM(S): at 11:17

## 2020-04-03 NOTE — PROGRESS NOTE ADULT - SUBJECTIVE AND OBJECTIVE BOX
ELISA MACHADO  ----------------------------------------  The patient was seen and evaluated for hypoxia. Offers no complaints.    Vital Signs Last 24 Hrs  T(C): 37.1 (03 Apr 2020 08:41), Max: 39.1 (02 Apr 2020 21:15)  T(F): 98.7 (03 Apr 2020 08:41), Max: 102.3 (02 Apr 2020 21:15)  HR: 95 (03 Apr 2020 08:41) (92 - 116)  BP: 130/77 (03 Apr 2020 08:41) (103/63 - 131/77)  BP(mean): --  RR: 20 (03 Apr 2020 08:41) (20 - 22)  SpO2: 95% (03 Apr 2020 08:41) (90% - 96%)    PHYSICAL EXAMINATION:  ----------------------------------------  General appearance: No acute distress, Awake, Alert  HEENT: Normocephalic, Atraumatic, Conjunctiva clear, EOMI  Neck: Supple, No JVD, No tenderness  Lungs: Breath sound equal bilaterally, No wheezes, Mild rales  Cardiovascular: S1S2, Regular rhythm  Abdomen: Soft, Nontender, Nondistended, No guarding/rebound, Positive bowel sounds  Extremities: No clubbing, No cyanosis, No edema, No calf tenderness  Neuro: Strength equal bilaterally, No tremors  Psychiatric: Appropriate mood, Normal affect    LABORATORY STUDIES:  ----------------------------------------             13.2   5.80  )-----------( 228      ( 03 Apr 2020 08:45 )             40.4     04-03    140  |  101  |  10.0  ----------------------------<  97  4.1   |  25.0  |  1.02    Ca    8.8      03 Apr 2020 08:45    TPro  7.6  /  Alb  3.5  /  TBili  0.2<L>  /  DBili  x   /  AST  44<H>  /  ALT  21  /  AlkPhos  47  04-02    LIVER FUNCTIONS - ( 02 Apr 2020 12:36 )  Alb: 3.5 g/dL / Pro: 7.6 g/dL / ALK PHOS: 47 U/L / ALT: 21 U/L / AST: 44 U/L / GGT: x           MEDICATIONS  (STANDING):  enoxaparin Injectable 40 milliGRAM(s) SubCutaneous daily  escitalopram 10 milliGRAM(s) Oral daily  hydroxychloroquine   Oral   hydroxychloroquine 200 milliGRAM(s) Oral every 12 hours  pantoprazole    Tablet 40 milliGRAM(s) Oral before breakfast    MEDICATIONS  (PRN):  acetaminophen   Tablet .. 650 milliGRAM(s) Oral every 6 hours PRN Temp greater or equal to 38C (100.4F), Mild Pain (1 - 3)  zolpidem 5 milliGRAM(s) Oral at bedtime PRN Insomnia      ASSESSMENT / PLAN:  ----------------------------------------  39M with a history of Down's syndrome who presented with fevers, dyspnea, and cough. Chest Xray was noted to have bilateral infiltrates and COVID-19 was noted to be positive. Supplemental oxygen was initiated for hypoxia.    Viral pneumonia / COVID-19 positive - Isolation precautions. On hydroxychloroquine.    Acute hypoxic respiratory failure - Supplemental oxygen with titration as tolerated. Pulse oxymetry monitoring.    Down's syndrome - On escitalopram.    Gastritis - On pantoprazole.    Hyperlipidemia - Pravastatin to be held for now.

## 2020-04-04 PROCEDURE — 99232 SBSQ HOSP IP/OBS MODERATE 35: CPT

## 2020-04-04 RX ADMIN — Medication 200 MILLIGRAM(S): at 15:17

## 2020-04-04 RX ADMIN — Medication 650 MILLIGRAM(S): at 04:02

## 2020-04-04 RX ADMIN — ZOLPIDEM TARTRATE 5 MILLIGRAM(S): 10 TABLET ORAL at 00:12

## 2020-04-04 RX ADMIN — Medication 650 MILLIGRAM(S): at 14:28

## 2020-04-04 RX ADMIN — Medication 200 MILLIGRAM(S): at 04:02

## 2020-04-04 RX ADMIN — Medication 90 MILLIGRAM(S): at 18:25

## 2020-04-04 RX ADMIN — ESCITALOPRAM OXALATE 10 MILLIGRAM(S): 10 TABLET, FILM COATED ORAL at 12:46

## 2020-04-04 RX ADMIN — ENOXAPARIN SODIUM 40 MILLIGRAM(S): 100 INJECTION SUBCUTANEOUS at 12:45

## 2020-04-04 NOTE — PROGRESS NOTE ADULT - SUBJECTIVE AND OBJECTIVE BOX
CC: viral PNA     INTERVAL HPI/OVERNIGHT EVENTS: seen and examined , did not offer much complains , was proning at time of evaluation   still on NRM           Vital Signs Last 24 Hrs  T(C): 37.7 (04 Apr 2020 16:30), Max: 38.1 (04 Apr 2020 03:57)  T(F): 99.8 (04 Apr 2020 16:30), Max: 100.5 (04 Apr 2020 03:57)  HR: 101 (04 Apr 2020 16:30) (101 - 125)  BP: 102/71 (04 Apr 2020 16:30) (100/72 - 120/79)  BP(mean): --  RR: 19 (04 Apr 2020 16:30) (17 - 20)  SpO2: 95% (04 Apr 2020 16:30) (93% - 97%)    PHYSICAL EXAM:    as per ED       LABS:                        13.2   5.80  )-----------( 228      ( 03 Apr 2020 08:45 )             40.4     04-03    140  |  101  |  10.0  ----------------------------<  97  4.1   |  25.0  |  1.02    Ca    8.8      03 Apr 2020 08:45              MEDICATIONS  (STANDING):  enoxaparin Injectable 40 milliGRAM(s) SubCutaneous daily  escitalopram 10 milliGRAM(s) Oral daily  hydroxychloroquine   Oral   hydroxychloroquine 200 milliGRAM(s) Oral every 12 hours  pantoprazole    Tablet 40 milliGRAM(s) Oral before breakfast    MEDICATIONS  (PRN):  acetaminophen   Tablet .. 650 milliGRAM(s) Oral every 6 hours PRN Temp greater or equal to 38C (100.4F), Mild Pain (1 - 3)  zolpidem 5 milliGRAM(s) Oral at bedtime PRN Insomnia      RADIOLOGY & ADDITIONAL TESTS:

## 2020-04-04 NOTE — PROGRESS NOTE ADULT - ASSESSMENT
1. Acute hypoxic respiratory failure   due to viral PNA   on NRM   monitor respiratory status     2. Viral PNA /COVID  on plaquenil and steroids     3. Hx of down syndrome   patient is around his  baseline     4. Hx of anxiety /depression  on lexapro.    5. DVT prophylaxis  lovenox     i personally called and updated his father Akbar Méndez .  bedside sitter to maintain safety , and keep mask on .

## 2020-04-05 PROCEDURE — 99232 SBSQ HOSP IP/OBS MODERATE 35: CPT

## 2020-04-05 RX ORDER — THIAMINE MONONITRATE (VIT B1) 100 MG
200 TABLET ORAL DAILY
Refills: 0 | Status: DISCONTINUED | OUTPATIENT
Start: 2020-04-05 | End: 2020-04-10

## 2020-04-05 RX ORDER — ASCORBIC ACID 60 MG
500 TABLET,CHEWABLE ORAL THREE TIMES A DAY
Refills: 0 | Status: DISCONTINUED | OUTPATIENT
Start: 2020-04-05 | End: 2020-04-10

## 2020-04-05 RX ADMIN — ENOXAPARIN SODIUM 40 MILLIGRAM(S): 100 INJECTION SUBCUTANEOUS at 16:53

## 2020-04-05 RX ADMIN — ESCITALOPRAM OXALATE 10 MILLIGRAM(S): 10 TABLET, FILM COATED ORAL at 16:53

## 2020-04-05 RX ADMIN — Medication 500 MILLIGRAM(S): at 21:55

## 2020-04-05 RX ADMIN — Medication 200 MILLIGRAM(S): at 17:01

## 2020-04-05 RX ADMIN — Medication 650 MILLIGRAM(S): at 17:02

## 2020-04-05 RX ADMIN — Medication 200 MILLIGRAM(S): at 05:28

## 2020-04-05 RX ADMIN — Medication 200 MILLIGRAM(S): at 17:02

## 2020-04-05 RX ADMIN — Medication 90 MILLIGRAM(S): at 05:28

## 2020-04-05 RX ADMIN — Medication 90 MILLIGRAM(S): at 17:01

## 2020-04-05 NOTE — DIETITIAN INITIAL EVALUATION ADULT. - OTHER INFO
39 yr old M with Down's Syndrome  Acute hypoxic respiratory failure   due to viral PNA   on NRM    Viral PNA /COVID

## 2020-04-05 NOTE — PROGRESS NOTE ADULT - SUBJECTIVE AND OBJECTIVE BOX
CC: viral PNA     INTERVAL HPI/OVERNIGHT EVENTS: seen and examined , still requiring NRM , tried to wean him to NC but he desaturated to 70s .   does not offer any complains , still spiking temps           Vital Signs Last 24 Hrs  T(C): 37.9 (05 Apr 2020 09:30), Max: 38.3 (04 Apr 2020 18:31)  T(F): 100.2 (05 Apr 2020 09:30), Max: 101 (04 Apr 2020 18:31)  HR: 89 (05 Apr 2020 09:30) (89 - 106)  BP: 135/79 (05 Apr 2020 09:30) (102/71 - 135/79)  BP(mean): --  RR: 18 (05 Apr 2020 09:30) (18 - 20)  SpO2: 96% (05 Apr 2020 10:14) (91% - 99%)    PHYSICAL EXAM:    as per ED         LABS:                  MEDICATIONS  (STANDING):  ascorbic acid 500 milliGRAM(s) Oral three times a day  enoxaparin Injectable 40 milliGRAM(s) SubCutaneous daily  escitalopram 10 milliGRAM(s) Oral daily  hydroxychloroquine   Oral   hydroxychloroquine 200 milliGRAM(s) Oral every 12 hours  methylPREDNISolone sodium succinate Injectable 90 milliGRAM(s) IV Push two times a day  pantoprazole    Tablet 40 milliGRAM(s) Oral before breakfast  thiamine 200 milliGRAM(s) Oral daily    MEDICATIONS  (PRN):  acetaminophen   Tablet .. 650 milliGRAM(s) Oral every 6 hours PRN Temp greater or equal to 38C (100.4F), Mild Pain (1 - 3)  zolpidem 5 milliGRAM(s) Oral at bedtime PRN Insomnia      RADIOLOGY & ADDITIONAL TESTS:

## 2020-04-05 NOTE — DIETITIAN INITIAL EVALUATION ADULT. - PERTINENT MEDS FT
rx sent please notify   MEDICATIONS  (STANDING):  enoxaparin Injectable 40 milliGRAM(s) SubCutaneous daily  escitalopram 10 milliGRAM(s) Oral daily  hydroxychloroquine   Oral   hydroxychloroquine 200 milliGRAM(s) Oral every 12 hours  methylPREDNISolone sodium succinate Injectable 90 milliGRAM(s) IV Push two times a day  pantoprazole    Tablet 40 milliGRAM(s) Oral before breakfast    MEDICATIONS  (PRN):  acetaminophen   Tablet .. 650 milliGRAM(s) Oral every 6 hours PRN Temp greater or equal to 38C (100.4F), Mild Pain (1 - 3)  zolpidem 5 milliGRAM(s) Oral at bedtime PRN Insomnia

## 2020-04-05 NOTE — PROGRESS NOTE ADULT - ASSESSMENT
1. Acute hypoxic respiratory failure   due to viral PNA   on NRM   monitor respiratory status   tried to wean him to NC but did not tolerate, also maintained on steroids     2. Viral PNA /COVID  on plaquenil and steroids     3. Hx of down syndrome   patient is around his  baseline     4. Hx of anxiety /depression  on lexapro.    5. DVT prophylaxis  lovenox     i personally called  father Akbar Méndez however he was not available , brother Mike Méndez answered the phone , he was updated about clinical status  .  bedside sitter to maintain safety , and keep mask on .   details discussed with nurse at bedside

## 2020-04-06 PROCEDURE — 99232 SBSQ HOSP IP/OBS MODERATE 35: CPT

## 2020-04-06 RX ADMIN — Medication 500 MILLIGRAM(S): at 20:06

## 2020-04-06 RX ADMIN — Medication 200 MILLIGRAM(S): at 12:07

## 2020-04-06 RX ADMIN — Medication 200 MILLIGRAM(S): at 06:07

## 2020-04-06 RX ADMIN — Medication 90 MILLIGRAM(S): at 06:07

## 2020-04-06 RX ADMIN — ESCITALOPRAM OXALATE 10 MILLIGRAM(S): 10 TABLET, FILM COATED ORAL at 12:07

## 2020-04-06 RX ADMIN — Medication 500 MILLIGRAM(S): at 18:38

## 2020-04-06 RX ADMIN — ENOXAPARIN SODIUM 40 MILLIGRAM(S): 100 INJECTION SUBCUTANEOUS at 12:07

## 2020-04-06 RX ADMIN — Medication 200 MILLIGRAM(S): at 18:38

## 2020-04-06 RX ADMIN — PANTOPRAZOLE SODIUM 40 MILLIGRAM(S): 20 TABLET, DELAYED RELEASE ORAL at 12:07

## 2020-04-06 RX ADMIN — Medication 500 MILLIGRAM(S): at 06:06

## 2020-04-06 RX ADMIN — Medication 90 MILLIGRAM(S): at 18:38

## 2020-04-06 NOTE — PROGRESS NOTE ADULT - ASSESSMENT
39 y.o M with PMH down syndrome presented to ED via request from PCP for fever/cough. Pt found to be hypoxic, admitted with hypoxic resp failure secondary to COVID infection.     1. Acute hypoxic respiratory failure   2/2 viral covid infection   C/w supplemental O2, still on NRB sating low 90s  1:1 at bedside as pt continues to pull and take off NRB   Monitor respiratory status   C/w steroids, plaquenil last dose today 4/6    2. Viral PNA /COVID  Plan as above    3. Hx of down syndrome   At baseline      4. Hx of anxiety /depression  On lexapro.    DVT prophylaxis  lovenox     DISPO pending further improvement in resp status, still on NRB 39 y.o M with PMH down syndrome presented to ED via request from PCP for fever/cough. Pt found to be hypoxic, admitted with hypoxic resp failure secondary to COVID infection.     1. Acute hypoxic respiratory failure   2/2 viral covid infection   C/w supplemental O2, still on NRB sating low 90s  1:1 at bedside as pt continues to pull and take off NRB   Monitor respiratory status   C/w steroids, plaquenil last dose today 4/6    2. Viral PNA /COVID  Plan as above    3. Hx of down syndrome   At baseline      4. Hx of anxiety /depression  On lexapro.    DVT prophylaxis  lovenox     DISPO pending further improvement in resp status, still on NRB   Discussed with pts father Akbar today via phone, all questions answered

## 2020-04-06 NOTE — PROGRESS NOTE ADULT - SUBJECTIVE AND OBJECTIVE BOX
CC: viral PNA     INTERVAL HPI/OVERNIGHT EVENTS: No overnight events    Pt seen and examined at bedside  Pt sitting in bed, keeps removing NRB  Does not verbalized any complaints  1:1 at bedside  Afebrile overnight     Vital Signs Last 24 Hrs  T(C): 36.7 (06 Apr 2020 08:23), Max: 37.4 (05 Apr 2020 17:03)  T(F): 98.1 (06 Apr 2020 08:23), Max: 99.3 (05 Apr 2020 17:03)  HR: 84 (06 Apr 2020 08:23) (79 - 92)  BP: 111/74 (06 Apr 2020 08:23) (109/84 - 118/87)  BP(mean): --  RR: 20 (06 Apr 2020 08:23) (20 - 24)  SpO2: 90% (06 Apr 2020 08:23) (90% - 92%)    PHYSICAL EXAM:  General appearance: No acute distress, Awake, Alert  HEENT: Normocephalic, Atraumatic, Conjunctiva clear, EOMI  Neck: Supple, No JVD, No tenderness  Lungs: Breath sound equal bilaterally, No wheezes, Mild rales  Cardiovascular: S1S2, Regular rhythm  Abdomen: Soft, Nontender, Nondistended, No guarding/rebound, Positive bowel sounds  Extremities: No clubbing, No cyanosis, No edema, No calf tenderness  Neuro: Strength equal bilaterally, No tremors  Psychiatric: Appropriate mood, Normal affect    LABS/IMAGING: REVIEWED CC: viral PNA     INTERVAL HPI/OVERNIGHT EVENTS: No overnight events    Pt seen and examined at bedside  Pt sitting in bed, keeps removing NRB  Non verbal  1:1 at bedside  Afebrile overnight     Vital Signs Last 24 Hrs  T(C): 36.7 (06 Apr 2020 08:23), Max: 37.4 (05 Apr 2020 17:03)  T(F): 98.1 (06 Apr 2020 08:23), Max: 99.3 (05 Apr 2020 17:03)  HR: 84 (06 Apr 2020 08:23) (79 - 92)  BP: 111/74 (06 Apr 2020 08:23) (109/84 - 118/87)  BP(mean): --  RR: 20 (06 Apr 2020 08:23) (20 - 24)  SpO2: 90% (06 Apr 2020 08:23) (90% - 92%)    PHYSICAL EXAM:  General appearance: No acute distress, Awake, Alert  HEENT: Normocephalic, Atraumatic, Conjunctiva clear, EOMI  Neck: Supple, No JVD, No tenderness  Lungs: Breath sound equal bilaterally, No wheezes, Mild rales  Cardiovascular: S1S2, Regular rhythm  Abdomen: Soft, Nontender, Nondistended, No guarding/rebound, Positive bowel sounds  Extremities: No clubbing, No cyanosis, No edema, No calf tenderness  Neuro: Strength equal bilaterally, No tremors, non verbal  Psychiatric: Appropriate mood, Normal affect    LABS/IMAGING: REVIEWED

## 2020-04-07 PROCEDURE — 99232 SBSQ HOSP IP/OBS MODERATE 35: CPT

## 2020-04-07 RX ADMIN — Medication 500 MILLIGRAM(S): at 11:54

## 2020-04-07 RX ADMIN — Medication 500 MILLIGRAM(S): at 04:33

## 2020-04-07 RX ADMIN — Medication 500 MILLIGRAM(S): at 22:41

## 2020-04-07 RX ADMIN — PANTOPRAZOLE SODIUM 40 MILLIGRAM(S): 20 TABLET, DELAYED RELEASE ORAL at 04:33

## 2020-04-07 RX ADMIN — Medication 200 MILLIGRAM(S): at 11:55

## 2020-04-07 RX ADMIN — ENOXAPARIN SODIUM 40 MILLIGRAM(S): 100 INJECTION SUBCUTANEOUS at 11:54

## 2020-04-07 RX ADMIN — Medication 90 MILLIGRAM(S): at 04:33

## 2020-04-07 RX ADMIN — ESCITALOPRAM OXALATE 10 MILLIGRAM(S): 10 TABLET, FILM COATED ORAL at 11:54

## 2020-04-07 RX ADMIN — Medication 90 MILLIGRAM(S): at 17:33

## 2020-04-07 RX ADMIN — Medication 200 MILLIGRAM(S): at 04:33

## 2020-04-07 NOTE — PROGRESS NOTE ADULT - ASSESSMENT
1. Acute hypoxic respiratory failure   due to viral PNA   on NRM , but sats are better , will switch to NC   monitor respiratory status   continue with steroids , will decrease dose tomorrow     2. Viral PNA /COVID  on plaquenil and steroids     3. Hx of down syndrome   patient is around his  baseline     4. Hx of anxiety /depression  on lexapro.    5. DVT prophylaxis  lovenox     i personally called  father Akbar Méndez multiple times , phone was not answered , will attempt to reach him again   details discussed with bedside nurse also

## 2020-04-07 NOTE — PROGRESS NOTE ADULT - SUBJECTIVE AND OBJECTIVE BOX
CC: hypoxia     INTERVAL HPI/OVERNIGHT EVENTS: seen and examined feels ok , oxygen improved , will switch NRM to nasal cannula .          Vital Signs Last 24 Hrs  T(C): 36.4 (07 Apr 2020 09:03), Max: 36.9 (06 Apr 2020 17:23)  T(F): 97.6 (07 Apr 2020 09:03), Max: 98.4 (06 Apr 2020 17:23)  HR: 64 (07 Apr 2020 09:03) (64 - 64)  BP: 100/70 (07 Apr 2020 09:03) (100/70 - 118/76)  BP(mean): --  RR: 19 (07 Apr 2020 09:03) (19 - 20)  SpO2: 97% (07 Apr 2020 09:03) (90% - 97%)    PHYSICAL EXAM:    as per previous notes       LABS:                  MEDICATIONS  (STANDING):  ascorbic acid 500 milliGRAM(s) Oral three times a day  enoxaparin Injectable 40 milliGRAM(s) SubCutaneous daily  escitalopram 10 milliGRAM(s) Oral daily  methylPREDNISolone sodium succinate Injectable 90 milliGRAM(s) IV Push two times a day  pantoprazole    Tablet 40 milliGRAM(s) Oral before breakfast  thiamine 200 milliGRAM(s) Oral daily    MEDICATIONS  (PRN):  acetaminophen   Tablet .. 650 milliGRAM(s) Oral every 6 hours PRN Temp greater or equal to 38C (100.4F), Mild Pain (1 - 3)  zolpidem 5 milliGRAM(s) Oral at bedtime PRN Insomnia      RADIOLOGY & ADDITIONAL TESTS:

## 2020-04-08 PROCEDURE — 99232 SBSQ HOSP IP/OBS MODERATE 35: CPT

## 2020-04-08 RX ADMIN — PANTOPRAZOLE SODIUM 40 MILLIGRAM(S): 20 TABLET, DELAYED RELEASE ORAL at 05:20

## 2020-04-08 RX ADMIN — Medication 500 MILLIGRAM(S): at 05:19

## 2020-04-08 RX ADMIN — ESCITALOPRAM OXALATE 10 MILLIGRAM(S): 10 TABLET, FILM COATED ORAL at 11:36

## 2020-04-08 RX ADMIN — ENOXAPARIN SODIUM 40 MILLIGRAM(S): 100 INJECTION SUBCUTANEOUS at 11:36

## 2020-04-08 RX ADMIN — Medication 90 MILLIGRAM(S): at 05:20

## 2020-04-08 RX ADMIN — Medication 200 MILLIGRAM(S): at 11:36

## 2020-04-08 RX ADMIN — Medication 90 MILLIGRAM(S): at 16:06

## 2020-04-08 RX ADMIN — Medication 500 MILLIGRAM(S): at 21:38

## 2020-04-08 RX ADMIN — Medication 500 MILLIGRAM(S): at 11:36

## 2020-04-08 NOTE — PROGRESS NOTE ADULT - SUBJECTIVE AND OBJECTIVE BOX
CC: hypoxia     INTERVAL HPI/OVERNIGHT EVENTS: No overnight evets    Pt seen and examined at bedside  1:1 at bedside  Pt doing well, weaned from NRB to 4L NC, now weaned to 2L NC    Vital Signs Last 24 Hrs  T(C): 36.7 (08 Apr 2020 09:55), Max: 37.3 (07 Apr 2020 16:41)  T(F): 98 (08 Apr 2020 09:55), Max: 99.1 (07 Apr 2020 16:41)  HR: 60 (08 Apr 2020 09:55) (60 - 71)  BP: 100/70 (08 Apr 2020 09:55) (100/70 - 119/83)  BP(mean): --  RR: 20 (08 Apr 2020 09:55) (19 - 20)  SpO2: 94% (08 Apr 2020 09:55) (91% - 95%)    PHYSICAL EXAM:  General appearance: No acute distress, Awake, Alert  HEENT: Normocephalic, Atraumatic, Conjunctiva clear, EOMI  Neck: Supple, No JVD, No tenderness  Lungs: Breath sound equal bilaterally, No wheezes, Mild rales  Cardiovascular: S1S2, Regular rhythm  Abdomen: Soft, Nontender, Nondistended, No guarding/rebound, Positive bowel sounds  Extremities: No clubbing, No cyanosis, No edema, No calf tenderness  Neuro: Strength equal bilaterally, No tremors, non verbal  Psychiatric: Appropriate mood, Normal affect        LABS/IMAGING: REVIEWED CC: hypoxia     INTERVAL HPI/OVERNIGHT EVENTS: No overnight events    Pt seen and examined at bedside  1:1 at bedside  Pt doing well, weaned from NRB to 4L NC, now weaned to 2L NC    Vital Signs Last 24 Hrs  T(C): 36.7 (08 Apr 2020 09:55), Max: 37.3 (07 Apr 2020 16:41)  T(F): 98 (08 Apr 2020 09:55), Max: 99.1 (07 Apr 2020 16:41)  HR: 60 (08 Apr 2020 09:55) (60 - 71)  BP: 100/70 (08 Apr 2020 09:55) (100/70 - 119/83)  BP(mean): --  RR: 20 (08 Apr 2020 09:55) (19 - 20)  SpO2: 94% (08 Apr 2020 09:55) (91% - 95%)    PHYSICAL EXAM:  General appearance: No acute distress, Awake, Alert  HEENT: Normocephalic, Atraumatic, Conjunctiva clear, EOMI  Neck: Supple, No JVD, No tenderness  Lungs: Breath sound equal bilaterally, No wheezes, Mild rales  Cardiovascular: S1S2, Regular rhythm  Abdomen: Soft, Nontender, Nondistended, No guarding/rebound, Positive bowel sounds  Extremities: No clubbing, No cyanosis, No edema, No calf tenderness  Neuro: Strength equal bilaterally, No tremors, non verbal  Psychiatric: Appropriate mood, Normal affect        LABS/IMAGING: REVIEWED

## 2020-04-08 NOTE — PROGRESS NOTE ADULT - ASSESSMENT
39 y.o M with PMH down syndrome presented to ED via request from PCP for fever/cough. Pt found to be hypoxic, admitted with hypoxic resp failure secondary to COVID infection.       1. Acute hypoxic respiratory failure   2/2 viral covid infection   C/w supplemental O2, weaned from NRB to 2L NC  1:1 at bedside  Monitor respiratory status   C/w steroids, plaquenil course completed     2. Viral PNA /COVID  Plan as above    3. Hx of down syndrome   At baseline      4. Hx of anxiety /depression  On lexapro.    DVT prophylaxis  lovenox     DISPO pending further improvement in resp status, possibly in 24 hrs if able to remove O2 tomorrow

## 2020-04-09 ENCOUNTER — TRANSCRIPTION ENCOUNTER (OUTPATIENT)
Age: 40
End: 2020-04-09

## 2020-04-09 LAB
ANION GAP SERPL CALC-SCNC: 14 MMOL/L — SIGNIFICANT CHANGE UP (ref 5–17)
BUN SERPL-MCNC: 24 MG/DL — HIGH (ref 8–20)
CALCIUM SERPL-MCNC: 8.9 MG/DL — SIGNIFICANT CHANGE UP (ref 8.6–10.2)
CHLORIDE SERPL-SCNC: 99 MMOL/L — SIGNIFICANT CHANGE UP (ref 98–107)
CO2 SERPL-SCNC: 30 MMOL/L — HIGH (ref 22–29)
CREAT SERPL-MCNC: 0.72 MG/DL — SIGNIFICANT CHANGE UP (ref 0.5–1.3)
CRP SERPL-MCNC: 0.48 MG/DL — HIGH (ref 0–0.4)
D DIMER BLD IA.RAPID-MCNC: 2266 NG/ML DDU — HIGH
GLUCOSE SERPL-MCNC: 100 MG/DL — HIGH (ref 70–99)
HCT VFR BLD CALC: 45.3 % — SIGNIFICANT CHANGE UP (ref 39–50)
HGB BLD-MCNC: 14.7 G/DL — SIGNIFICANT CHANGE UP (ref 13–17)
MCHC RBC-ENTMCNC: 30.4 PG — SIGNIFICANT CHANGE UP (ref 27–34)
MCHC RBC-ENTMCNC: 32.5 GM/DL — SIGNIFICANT CHANGE UP (ref 32–36)
MCV RBC AUTO: 93.8 FL — SIGNIFICANT CHANGE UP (ref 80–100)
PLATELET # BLD AUTO: 459 K/UL — HIGH (ref 150–400)
POTASSIUM SERPL-MCNC: 4.3 MMOL/L — SIGNIFICANT CHANGE UP (ref 3.5–5.3)
POTASSIUM SERPL-SCNC: 4.3 MMOL/L — SIGNIFICANT CHANGE UP (ref 3.5–5.3)
RBC # BLD: 4.83 M/UL — SIGNIFICANT CHANGE UP (ref 4.2–5.8)
RBC # FLD: 12.9 % — SIGNIFICANT CHANGE UP (ref 10.3–14.5)
SODIUM SERPL-SCNC: 143 MMOL/L — SIGNIFICANT CHANGE UP (ref 135–145)
WBC # BLD: 10.32 K/UL — SIGNIFICANT CHANGE UP (ref 3.8–10.5)
WBC # FLD AUTO: 10.32 K/UL — SIGNIFICANT CHANGE UP (ref 3.8–10.5)

## 2020-04-09 PROCEDURE — 99232 SBSQ HOSP IP/OBS MODERATE 35: CPT

## 2020-04-09 PROCEDURE — 93970 EXTREMITY STUDY: CPT | Mod: 26

## 2020-04-09 RX ORDER — ACETAMINOPHEN 500 MG
2 TABLET ORAL
Qty: 0 | Refills: 0 | DISCHARGE
Start: 2020-04-09

## 2020-04-09 RX ADMIN — Medication 500 MILLIGRAM(S): at 11:35

## 2020-04-09 RX ADMIN — PANTOPRAZOLE SODIUM 40 MILLIGRAM(S): 20 TABLET, DELAYED RELEASE ORAL at 04:48

## 2020-04-09 RX ADMIN — Medication 500 MILLIGRAM(S): at 04:47

## 2020-04-09 RX ADMIN — Medication 500 MILLIGRAM(S): at 21:26

## 2020-04-09 RX ADMIN — ESCITALOPRAM OXALATE 10 MILLIGRAM(S): 10 TABLET, FILM COATED ORAL at 11:36

## 2020-04-09 RX ADMIN — ENOXAPARIN SODIUM 40 MILLIGRAM(S): 100 INJECTION SUBCUTANEOUS at 11:36

## 2020-04-09 RX ADMIN — Medication 200 MILLIGRAM(S): at 11:36

## 2020-04-09 NOTE — DISCHARGE NOTE PROVIDER - NSDCFUADDINST_GEN_ALL_CORE_FT
You have tested POSITIVE for the novel coronavirus (COVID-19). Upon discharge, you must self-quarantine for 14 days, or until the Department of Health contacts you.  You should restrict activities outside of your home except for getting medical care. Do not go to work, school or public areas. Avoid using public transportation. Please wear a face mask if you are around other individuals. Try to avoid contact with house members, family, and friends for the duration of this quarantine. Please follow up with your primary care physician within 2-3 weeks of your discharge from [facility]. Please take all medications as prescribed. If you experience any worsening or recurrence of your symptoms, particularly worsening or high fever, shortness of breathe, extreme fatigue, or bloody cough please call 9-1-1 immediately or report to the nearest Emergency Department. If you have any questions or concerns, please do not hesitate to call the hospital at [phone]. You have tested POSITIVE for the novel coronavirus (COVID-19). Upon discharge, you must self-quarantine for 14 days, or until the Department of Health contacts you.  You should restrict activities outside of your home except for getting medical care. Do not go to work, school or public areas. Avoid using public transportation. Please wear a face mask if you are around other individuals. Try to avoid contact with house members, family, and friends for the duration of this quarantine. Please follow up with your primary care physician within 2-3 weeks of your discharge from [facility]. Please take all medications as prescribed. If you experience any worsening or recurrence of your symptoms, particularly worsening or high fever, shortness of breathe, extreme fatigue, or bloody cough please call 9-1-1 immediately or report to the nearest Emergency Department. If you have any questions or concerns, please do not hesitate to call the hospital at [phone].    No change in home medications. Please resume home meds on discharge. No new meds were added to home regimen.

## 2020-04-09 NOTE — PROGRESS NOTE ADULT - SUBJECTIVE AND OBJECTIVE BOX
CC: hypoxia     INTERVAL HPI/OVERNIGHT EVENTS: No overnight events    Pt seen and examined at bedside  1:1 at bedside  Pt doing well  At bedside pt found with oxy mask not on face and still sating 94%  Removed oxy mask, will observe pt on room air for now    Vital Signs Last 24 Hrs  T(C): 36.6 (08 Apr 2020 23:12), Max: 37.7 (08 Apr 2020 16:39)  T(F): 97.8 (08 Apr 2020 23:12), Max: 99.9 (08 Apr 2020 16:39)  HR: 59 (08 Apr 2020 23:12) (59 - 76)  BP: 108/71 (08 Apr 2020 23:12) (100/70 - 115/79)  BP(mean): --  RR: 20 (08 Apr 2020 23:12) (20 - 20)  SpO2: 93% (08 Apr 2020 23:12) (82% - 94%) on oxy mask, now on room air    PHYSICAL EXAM:  General appearance: No acute distress, Awake, Alert  HEENT: Normocephalic, Atraumatic, Conjunctiva clear, EOMI  Neck: Supple, No JVD, No tenderness  Lungs: Breath sound equal bilaterally, No wheezes, Mild rales  Cardiovascular: S1S2, Regular rhythm  Abdomen: Soft, Nontender, Nondistended, No guarding/rebound, Positive bowel sounds  Extremities: No clubbing, No cyanosis, No edema, No calf tenderness  Neuro: Strength equal bilaterally, No tremors, non verbal  Psychiatric: Appropriate mood, Normal affect        LABS/IMAGING: REVIEWED CC: hypoxia     INTERVAL HPI/OVERNIGHT EVENTS: No overnight events    Pt seen and examined at bedside  1:1 at bedside  Pt doing well  At bedside pt found with oxy mask. Placed on3L and doing well    Vital Signs Last 24 Hrs  T(C): 36.6 (08 Apr 2020 23:12), Max: 37.7 (08 Apr 2020 16:39)  T(F): 97.8 (08 Apr 2020 23:12), Max: 99.9 (08 Apr 2020 16:39)  HR: 59 (08 Apr 2020 23:12) (59 - 76)  BP: 108/71 (08 Apr 2020 23:12) (100/70 - 115/79)  BP(mean): --  RR: 20 (08 Apr 2020 23:12) (20 - 20)  SpO2: 93% (08 Apr 2020 23:12) (82% - 94%) on oxy mask, now on 3L NC    PHYSICAL EXAM:  General appearance: No acute distress, Awake, Alert  HEENT: Normocephalic, Atraumatic, Conjunctiva clear, EOMI  Neck: Supple, No JVD, No tenderness  Lungs: Breath sound equal bilaterally, No wheezes, Mild rales  Cardiovascular: S1S2, Regular rhythm  Abdomen: Soft, Nontender, Nondistended, No guarding/rebound, Positive bowel sounds  Extremities: No clubbing, No cyanosis, No edema, No calf tenderness  Neuro: Strength equal bilaterally, No tremors, non verbal  Psychiatric: Appropriate mood, Normal affect        LABS/IMAGING: REVIEWED CC: hypoxia     INTERVAL HPI/OVERNIGHT EVENTS: No overnight events    Pt seen and examined at bedside  1:1 at bedside  Pt doing well  At bedside pt found with oxy mask. Placed on 2L and doing well    Vital Signs Last 24 Hrs  T(C): 36.6 (08 Apr 2020 23:12), Max: 37.7 (08 Apr 2020 16:39)  T(F): 97.8 (08 Apr 2020 23:12), Max: 99.9 (08 Apr 2020 16:39)  HR: 59 (08 Apr 2020 23:12) (59 - 76)  BP: 108/71 (08 Apr 2020 23:12) (100/70 - 115/79)  RR: 20 (08 Apr 2020 23:12) (20 - 20)  SpO2: 93% (08 Apr 2020 23:12) (82% - 94%) on oxy mask, now on 2L NC    PHYSICAL EXAM:  General appearance: No acute distress, Awake, Alert  HEENT: Normocephalic, Atraumatic, Conjunctiva clear, EOMI  Neck: Supple, No JVD, No tenderness  Lungs: Breath sound equal bilaterally, No wheezes, Mild rales  Cardiovascular: S1S2, Regular rhythm  Abdomen: Soft, Nontender, Nondistended, No guarding/rebound, Positive bowel sounds  Extremities: No clubbing, No cyanosis, No edema, No calf tenderness  Neuro: Strength equal bilaterally, No tremors, non verbal  Psychiatric: Appropriate mood, Normal affect        LABS/IMAGING: REVIEWED CC: hypoxia     INTERVAL HPI/OVERNIGHT EVENTS: No overnight events    Pt seen and examined at bedside  1:1 at bedside  Pt doing well  At bedside pt found with oxy mask. Placed on 2L and doing well however on ambulation on room air desats to mid 70s and remains in mid 80s with 5L NC for a while after exertion.     Vital Signs Last 24 Hrs  T(C): 36.6 (08 Apr 2020 23:12), Max: 37.7 (08 Apr 2020 16:39)  T(F): 97.8 (08 Apr 2020 23:12), Max: 99.9 (08 Apr 2020 16:39)  HR: 59 (08 Apr 2020 23:12) (59 - 76)  BP: 108/71 (08 Apr 2020 23:12) (100/70 - 115/79)  RR: 20 (08 Apr 2020 23:12) (20 - 20)  SpO2: 93% (08 Apr 2020 23:12) (82% - 94%) on oxy mask, now on 2L NC    PHYSICAL EXAM:  General appearance: No acute distress, Awake, Alert  HEENT: Normocephalic, Atraumatic, Conjunctiva clear, EOMI  Neck: Supple, No JVD, No tenderness  Lungs: Breath sound equal bilaterally, No wheezes, Mild rales  Cardiovascular: S1S2, Regular rhythm  Abdomen: Soft, Nontender, Nondistended, No guarding/rebound, Positive bowel sounds  Extremities: No clubbing, No cyanosis, No edema, No calf tenderness  Neuro: Strength equal bilaterally, No tremors, non verbal  Psychiatric: Appropriate mood, Normal affect        LABS/IMAGING: REVIEWED

## 2020-04-09 NOTE — PROGRESS NOTE ADULT - ASSESSMENT
39 y.o M with PMH down syndrome presented to ED via request from PCP for fever/cough. Pt found to be hypoxic, admitted with hypoxic resp failure secondary to COVID infection.       1. Acute hypoxic respiratory failure   2/2 viral covid infection   C/w supplemental O2 and wean. Was on oxy mask overnight, found this am with mask barely on face sating low to mid 90s. Will trial on room air  1:1 at bedside  Monitor respiratory status   C/w steroids, plaquenil course completed     2. Viral PNA /COVID  Plan as above    3. Hx of down syndrome   At baseline      4. Hx of anxiety /depression  On lexapro.    DVT prophylaxis  lovenox     DISPO: Possibly home later today pending sats on RA. 39 y.o M with PMH down syndrome presented to ED via request from PCP for fever/cough. Pt found to be hypoxic, admitted with hypoxic resp failure secondary to COVID infection.       1. Acute hypoxic respiratory failure   2/2 viral covid infection   C/w supplemental O2 and wean. Was on oxy mask overnight, now on 3L NC doing well. Will check O2 studies, possibly home with home O2  1:1 at bedside  Monitor respiratory status   C/w steroids, plaquenil course completed     2. Viral PNA /COVID  Plan as above    3. Hx of down syndrome   At baseline      4. Hx of anxiety /depression  On lexapro.    DVT prophylaxis  lovenox     DISPO: Possibly home later today pending O2 studies/home O2 arrangement 39 y.o M with PMH down syndrome presented to ED via request from PCP for fever/cough. Pt found to be hypoxic, admitted with hypoxic resp failure secondary to COVID infection.       1. Acute hypoxic respiratory failure   2/2 viral covid infection   C/w supplemental O2 and wean. Was on oxy mask overnight, now on 2L NC doing well. Will check O2 studies, possibly home with home O2  1:1 at bedside  Monitor respiratory status   C/w steroids, plaquenil course completed     2. Viral PNA /COVID  Plan as above    3. Hx of down syndrome   At baseline      4. Hx of anxiety /depression  On lexapro.    DVT prophylaxis  lovenox     DISPO: Possibly home later today pending O2 studies/home O2 arrangement 39 y.o M with PMH down syndrome presented to ED via request from PCP for fever/cough. Pt found to be hypoxic, admitted with hypoxic resp failure secondary to COVID infection.       1. Acute hypoxic respiratory failure   2/2 viral covid infection   C/w supplemental O2 and wean. Was on oxy mask overnight, now on 2L NC doing well. O2 sats on ambulation on room air mid 70s. Supplemented with 5L NC and did not recover for well over 15mins. Unable to send out like this. Will reassess tomorrow.   1:1 at bedside  Monitor respiratory status   C/w steroids, plaquenil course completed     2. Viral PNA /COVID  Plan as above    3. Hx of down syndrome   At baseline      4. Hx of anxiety /depression  On lexapro.    DVT prophylaxis  lovenox     DISPO: Pending further improvement in O2 needs. O2 sats on ambulation on room air mid 70s. Supplemented with 5L NC and did not recover for well over 15mins. Unable to send out like this. Will reassess tomorrow.

## 2020-04-09 NOTE — GOALS OF CARE CONVERSATION - ADVANCED CARE PLANNING - CONVERSATION DETAILS
Palliative Care SW had been following with patients needs since developmentally disabled in case of need for advance directives and goals of care with father. Patient is affiliated with [facility] and therefore there are [facility] guidelines which must be followed with regards to directives. SW aware patient has been progressing and improving with medical needs and plan for discharge home. At present directives not needed with no acute decompensation currently. Since patient resides at home with father  [employer] attorneys are not involved in decisions or review of case but contact would need to be made to [facility] office contact Kelly Salmeron for further review if in future directives need to be obtained.

## 2020-04-09 NOTE — DISCHARGE NOTE PROVIDER - HOSPITAL COURSE
39 y.o M with PMH down syndrome presented to ED via request from PCP for fever/cough. Pt found to be hypoxic, admitted with hypoxic resp failure secondary to COVID infection. Improved with supplemental O2 and course of steroids and plaquenil.         You have tested POSITIVE for the novel coronavirus (COVID-19). Upon discharge, you must self-quarantine for 14 days, or until the Department of Health contacts you.  You should restrict activities outside of your home except for getting medical care. Do not go to work, school or public areas. Avoid using public transportation. Please wear a face mask if you are around other individuals. Try to avoid contact with house members, family, and friends for the duration of this quarantine. Please follow up with your primary care physician within 2-3 weeks of your discharge from [facility]. Please take all medications as prescribed. If you experience any worsening or recurrence of your symptoms, particularly worsening or high fever, shortness of breathe, extreme fatigue, or bloody cough please call 9-1-1 immediately or report to the nearest Emergency Department. If you have any questions or concerns, please do not hesitate to call the hospital at [phone]. 39 y.o M with PMH down syndrome presented to ED via request from PCP for fever/cough. Pt found to be hypoxic, admitted with hypoxic resp failure secondary to COVID infection. Improved with supplemental O2 and course of steroids and plaquenil. Now stable for DC home 39 y.o M with PMH down syndrome presented to ED via request from PCP for fever/cough. Pt found to be hypoxic, admitted with hypoxic resp failure secondary to COVID infection. Improved with supplemental O2 and course of steroids and plaquenil. Now stable for DC home. No change in home medications. Please resume home meds on dischage. No new meds were added to home regimen. 39 y.o M with PMH down syndrome presented to ED via request from PCP for fever/cough. Pt found to be hypoxic, admitted with hypoxic resp failure secondary to COVID infection. Improved with supplemental O2 and course of steroids and plaquenil. Now stable for DC home. No change in home medications. Please resume home meds on dischage. No new meds were added to home regimen.         PATIENT WILL BE QUARANTINED THROUGH APRIL 16TH 39 y.o M with PMH down syndrome presented to ED via request from PCP for fever/cough. Pt found to be hypoxic, admitted with hypoxic resp failure secondary to COVID infection. Improved with supplemental O2 and course of steroids and plaquenil. Now stable for DC home. No change in home medications. Please resume home meds on dischage. No new meds were added to home regimen.         PATIENT WILL BE QUARANTINED THROUGH APRIL 16TH        attestation : patient seen and examined , stable for discharge ,based on IMPROVE score 0.4 patient did not qualify for AC . he was screened for DVT was negative     please refer to progress note for details        38 minutes were spent on discharge co ordination

## 2020-04-09 NOTE — DISCHARGE NOTE PROVIDER - NSDCMRMEDTOKEN_GEN_ALL_CORE_FT
acetaminophen 325 mg oral tablet: 2 tab(s) orally every 6 hours, As needed, Temp greater or equal to 38C (100.4F), Mild Pain (1 - 3)  escitalopram 20 mg oral tablet: 0.5 tab(s) orally once a day  omeprazole 20 mg oral delayed release tablet: 1 tab(s) orally once a day  pravastatin 20 mg oral tablet: 1 tab(s) orally once a day  Saphris 2.5 mg sublingual tablet: 2 tab(s) sublingual 2 times a day  Vascepa 1 g oral capsule: 2 cap(s) orally 2 times a day  zolpidem 10 mg oral tablet: 1 tab(s) orally once a day (at bedtime)

## 2020-04-09 NOTE — DISCHARGE NOTE PROVIDER - NSDCCPCAREPLAN_GEN_ALL_CORE_FT
PRINCIPAL DISCHARGE DIAGNOSIS  Diagnosis: Viral illness  Assessment and Plan of Treatment: Now resolved and stable for DC. Secondary to COVID. Follow covid DC instructions.      SECONDARY DISCHARGE DIAGNOSES  Diagnosis: Acute respiratory failure  Assessment and Plan of Treatment: Secondary to COVID. Now stable for DC    Diagnosis: Down syndrome  Assessment and Plan of Treatment: PRINCIPAL DISCHARGE DIAGNOSIS  Diagnosis: Viral illness  Assessment and Plan of Treatment: Now resolved and stable for DC. Secondary to COVID. Follow covid DC instructions.      SECONDARY DISCHARGE DIAGNOSES  Diagnosis: Acute respiratory failure  Assessment and Plan of Treatment: Secondary to COVID. Now stable for DC. Does not require O2 on discharge. On room air at rest 96%. On room air on ambulation 93-95%    Diagnosis: Down syndrome  Assessment and Plan of Treatment: PRINCIPAL DISCHARGE DIAGNOSIS  Diagnosis: Viral illness  Assessment and Plan of Treatment: Now resolved and stable for DC. Secondary to COVID. Follow covid DC instructions.  PATIENT WILL BE QUARANTINED THROUGH APRIL 16TH      SECONDARY DISCHARGE DIAGNOSES  Diagnosis: Acute respiratory failure  Assessment and Plan of Treatment: Secondary to COVID. Now stable for DC. Does not require O2 on discharge. On room air at rest 96%. On room air on ambulation 93-95%    Diagnosis: Down syndrome  Assessment and Plan of Treatment:

## 2020-04-10 ENCOUNTER — TRANSCRIPTION ENCOUNTER (OUTPATIENT)
Age: 40
End: 2020-04-10

## 2020-04-10 VITALS — TEMPERATURE: 98 F

## 2020-04-10 PROCEDURE — 93010 ELECTROCARDIOGRAM REPORT: CPT

## 2020-04-10 PROCEDURE — 80053 COMPREHEN METABOLIC PANEL: CPT

## 2020-04-10 PROCEDURE — 84145 PROCALCITONIN (PCT): CPT

## 2020-04-10 PROCEDURE — 99285 EMERGENCY DEPT VISIT HI MDM: CPT | Mod: 25

## 2020-04-10 PROCEDURE — 99239 HOSP IP/OBS DSCHRG MGMT >30: CPT

## 2020-04-10 PROCEDURE — 83615 LACTATE (LD) (LDH) ENZYME: CPT

## 2020-04-10 PROCEDURE — 93005 ELECTROCARDIOGRAM TRACING: CPT

## 2020-04-10 PROCEDURE — 87635 SARS-COV-2 COVID-19 AMP PRB: CPT

## 2020-04-10 PROCEDURE — 36415 COLL VENOUS BLD VENIPUNCTURE: CPT

## 2020-04-10 PROCEDURE — 71045 X-RAY EXAM CHEST 1 VIEW: CPT

## 2020-04-10 PROCEDURE — 85027 COMPLETE CBC AUTOMATED: CPT

## 2020-04-10 PROCEDURE — 85379 FIBRIN DEGRADATION QUANT: CPT

## 2020-04-10 PROCEDURE — 86140 C-REACTIVE PROTEIN: CPT

## 2020-04-10 PROCEDURE — 82728 ASSAY OF FERRITIN: CPT

## 2020-04-10 PROCEDURE — 93970 EXTREMITY STUDY: CPT

## 2020-04-10 PROCEDURE — 80048 BASIC METABOLIC PNL TOTAL CA: CPT

## 2020-04-10 RX ADMIN — PANTOPRAZOLE SODIUM 40 MILLIGRAM(S): 20 TABLET, DELAYED RELEASE ORAL at 05:25

## 2020-04-10 RX ADMIN — ENOXAPARIN SODIUM 40 MILLIGRAM(S): 100 INJECTION SUBCUTANEOUS at 13:16

## 2020-04-10 RX ADMIN — ESCITALOPRAM OXALATE 10 MILLIGRAM(S): 10 TABLET, FILM COATED ORAL at 13:16

## 2020-04-10 RX ADMIN — Medication 500 MILLIGRAM(S): at 05:25

## 2020-04-10 RX ADMIN — Medication 500 MILLIGRAM(S): at 13:16

## 2020-04-10 RX ADMIN — Medication 200 MILLIGRAM(S): at 13:16

## 2020-04-10 NOTE — DISCHARGE NOTE NURSING/CASE MANAGEMENT/SOCIAL WORK - PATIENT PORTAL LINK FT
You can access the FollowMyHealth Patient Portal offered by [facility] by registering at the following website: [email or web address]. By joining [employer]’s FollowMyHealth portal, you will also be able to view your health information using other applications (apps) compatible with our system.

## 2020-04-10 NOTE — CHART NOTE - NSCHARTNOTEFT_GEN_A_CORE
Source: Patient [ ]  Family [ ]   other [x ]EMR    Current Diet: Dash      PO intake:  < 50% [ ]   50-75%  [x ]   %  [ ]  other :    Source for PO intake [ ] Patient [ ] family [x ] chart [ ] staff [ ] other    Enteral /Parenteral Nutrition:     Current Weight: no newweight to assess    % Weight Change     Pertinent Medications: MEDICATIONS  (STANDING):  ascorbic acid 500 milliGRAM(s) Oral three times a day  enoxaparin Injectable 40 milliGRAM(s) SubCutaneous daily  escitalopram 10 milliGRAM(s) Oral daily  pantoprazole    Tablet 40 milliGRAM(s) Oral before breakfast  thiamine 200 milliGRAM(s) Oral daily    MEDICATIONS  (PRN):  acetaminophen   Tablet .. 650 milliGRAM(s) Oral every 6 hours PRN Temp greater or equal to 38C (100.4F), Mild Pain (1 - 3)    Pertinent Labs: CBC Full  -  ( 09 Apr 2020 14:11 )  WBC Count : 10.32 K/uL  RBC Count : 4.83 M/uL  Hemoglobin : 14.7 g/dL  Hematocrit : 45.3 %  Platelet Count - Automated : 459 K/uL  Mean Cell Volume : 93.8 fl  Mean Cell Hemoglobin : 30.4 pg  Mean Cell Hemoglobin Concentration : 32.5 gm/dL  Auto Neutrophil # : x  Auto Lymphocyte # : x  Auto Monocyte # : x  Auto Eosinophil # : x  Auto Basophil # : x  Auto Neutrophil % : x  Auto Lymphocyte % : x  Auto Monocyte % : x  Auto Eosinophil % : x  Auto Basophil % : x      04-09 Na143 mmol/L Glu 100 mg/dL<H> K+ 4.3 mmol/L Cr  0.72 mg/dL BUN 24.0 mg/dL<H> Phos n/a   Alb n/a   PAB n/a           Skin: no edema noted as per flow sheets    Nutrition focused physical exam not conducted - found signs of malnutrition [ ]absent [ ]present    Subcutaneous fat loss: [ ] Orbital fat pads region, [ ]Buccal fat region, [ ]Triceps region,  [ ]Ribs region    Muscle wasting: [ ]Temples region, [ ]Clavicle region, [ ]Shoulder region, [ ]Scapula region, [ ]Interosseous region,  [ ]thigh region, [ ]Calf region    Estimated Needs:   [x ] no change since previous assessment  [ ] recalculated:     Current Nutrition Diagnosis: Increased Nutrient Needs related to increased physiologic demand of stress factor as evidenced by COVID +. Pt still requiring O2.      Recommendations: Continue with Vit C, thiamine. Encourage good appetite.     Monitoring and Evaluation:   [ ] PO intake [ ] Tolerance to diet prescription [X] Weights  [X] Follow up per protocol [X] Labs:

## 2020-04-10 NOTE — PROGRESS NOTE ADULT - SUBJECTIVE AND OBJECTIVE BOX
CC: hypoxia     INTERVAL HPI/OVERNIGHT EVENTS: No overnight events    Pt seen and examined at bedside  1:1 at bedside  Pt doing well  Pt on room air at rest 96%. Pt on room air on ambulation 93-95%. No O2 needs at home  For DC home today     Vital Signs Last 24 Hrs  T(C): 36.9 (10 Apr 2020 09:23), Max: 36.9 (10 Apr 2020 09:23)  T(F): 98.5 (10 Apr 2020 09:23), Max: 98.5 (10 Apr 2020 09:23)  HR: 68 (10 Apr 2020 07:25) (68 - 68)  BP: 135/82 (10 Apr 2020 07:25) (135/82 - 135/82)  BP(mean): --  RR: 19 (10 Apr 2020 07:25) (16 - 19)  SpO2: 94% (10 Apr 2020 09:16) (94% - 95%) on RA    PHYSICAL EXAM:  General appearance: No acute distress, Awake, Alert  HEENT: Normocephalic, Atraumatic, Conjunctiva clear, EOMI  Neck: Supple, No JVD, No tenderness  Lungs: Breath sound equal bilaterally, No wheezes, Mild rales  Cardiovascular: S1S2, Regular rhythm  Abdomen: Soft, Nontender, Nondistended, No guarding/rebound, Positive bowel sounds  Extremities: No clubbing, No cyanosis, No edema, No calf tenderness  Neuro: Strength equal bilaterally, No tremors, non verbal  Psychiatric: Appropriate mood, Normal affect        LABS/IMAGING: REVIEWED

## 2020-04-10 NOTE — PROGRESS NOTE ADULT - ASSESSMENT
39 y.o M with PMH down syndrome presented to ED via request from PCP for fever/cough. Pt found to be hypoxic, admitted with hypoxic resp failure secondary to COVID infection.       1. Acute hypoxic respiratory failure   2/2 viral covid infection     1:1 at bedside  Monitor respiratory status   C/w steroids, plaquenil course completed     2. Viral PNA /COVID  Plan as above  Pt on room air at rest 96%. Pt on room air on ambulation 93-95%. No O2 needs at home  3. Hx of down syndrome   At baseline      4. Hx of anxiety /depression  On lexapro.    DVT prophylaxis  lovenox     DISPO: DC home today once aides reinstated. No home o2 needed. 39 y.o M with PMH down syndrome presented to ED via request from PCP for fever/cough. Pt found to be hypoxic, admitted with hypoxic resp failure secondary to COVID infection.       1. Acute hypoxic respiratory failure   2/2 viral covid infection   1:1 at bedside  Pt on room air at rest 96%. Pt on room air on ambulation 93-95%. No O2 needs at home  Monitor respiratory status   C/w steroids, Plaquenil course completed   Yesterday d dimer elevated. Ordered venous ultrasound negative.     2. Viral PNA /COVID  Plan as above  Pt on room air at rest 96%. Pt on room air on ambulation 93-95%. No O2 needs at home  3. Hx of down syndrome   At baseline      4. Hx of anxiety /depression  On lexapro.    DVT prophylaxis  lovenox     DISPO: DC home today once aides reinstated. No home o2 needed.

## 2020-04-10 NOTE — PROGRESS NOTE ADULT - ATTENDING COMMENTS
Estimated discharge date undetermined.
seen and examined , feels good  ready for discharge   please refer to discharge note for details
seen and examined .  was weaned down from NRM to NC   will monitor for 24 hours , obtain oxygen studies in am   father was updated by me personally
patient seen and examined , agree with above
seen and examined, gradually weaning oxygen down .  unable to be discharged with fluctuating oxygen needs   will update father

## 2020-04-10 NOTE — PROGRESS NOTE ADULT - NSREFPHYEXREFTO_GEN_ALL_CORE
ED Physical Exam
Inpatient Physical Exam

## 2022-07-12 ENCOUNTER — NON-APPOINTMENT (OUTPATIENT)
Age: 42
End: 2022-07-12

## 2022-12-24 NOTE — ED ADULT NURSE NOTE - NS ED NOTE ABUSE RESPONSE YN
Yes Communicate Risk of Fall with Harm to all staff/Reinforce activity limits and safety measures with patient and family/Tailored Fall Risk Interventions/Toileting schedule using arm’s reach rule for commode and bathroom/Use of alarms - bed, chair and/or voice tab/Visual Cue: Yellow wristband and red socks/Bed in lowest position, wheels locked, appropriate side rails in place/Call bell, personal items and telephone in reach/Instruct patient to call for assistance before getting out of bed or chair/Non-slip footwear when patient is out of bed/[place] to call system/Physically safe environment - no spills, clutter or unnecessary equipment/Purposeful Proactive Rounding/Room/bathroom lighting operational, light cord in reach

## 2023-04-19 ENCOUNTER — EMERGENCY (EMERGENCY)
Facility: HOSPITAL | Age: 43
LOS: 1 days | Discharge: HOME | End: 2023-04-19
Attending: EMERGENCY MEDICINE
Payer: MEDICARE

## 2023-04-19 VITALS
RESPIRATION RATE: 16 BRPM | SYSTOLIC BLOOD PRESSURE: 131 MMHG | HEART RATE: 85 BPM | TEMPERATURE: 98 F | OXYGEN SATURATION: 99 % | HEIGHT: 63 IN | WEIGHT: 179.9 LBS | DIASTOLIC BLOOD PRESSURE: 82 MMHG

## 2023-04-19 VITALS
OXYGEN SATURATION: 98 % | TEMPERATURE: 98 F | SYSTOLIC BLOOD PRESSURE: 122 MMHG | HEART RATE: 69 BPM | RESPIRATION RATE: 18 BRPM | DIASTOLIC BLOOD PRESSURE: 67 MMHG

## 2023-04-19 PROCEDURE — 70360 X-RAY EXAM OF NECK: CPT | Mod: 26

## 2023-04-19 PROCEDURE — 99284 EMERGENCY DEPT VISIT MOD MDM: CPT | Mod: 25

## 2023-04-19 PROCEDURE — 71045 X-RAY EXAM CHEST 1 VIEW: CPT

## 2023-04-19 PROCEDURE — 71045 X-RAY EXAM CHEST 1 VIEW: CPT | Mod: 26

## 2023-04-19 PROCEDURE — 70360 X-RAY EXAM OF NECK: CPT

## 2023-04-19 PROCEDURE — 99285 EMERGENCY DEPT VISIT HI MDM: CPT | Mod: GC

## 2023-04-19 NOTE — ED PROVIDER NOTE - ATTENDING CONTRIBUTION TO CARE
I, Alexandr Mensah, performed a face to face bedside interview with this patient regarding history of present illness, review of symptoms and relevant past medical, social and family history.  I completed an independent physical examination. I have communicated the patient’s plan of care and disposition with the resident.  42 year old male with PMh down syndrome presents with choking episode in the group home. Pt was eating hamburger, started to choke, got heimlich, brought up food, and now acting normally  Gen: NAD, well appearing  CV: RRR  Pul: CTA b/l  Abd: Soft, non-distended, non-tender  Neuro: no focal deficits  Pt improved, tolerating PO, no hypoxia, stable for dc

## 2023-04-19 NOTE — ED PROVIDER NOTE - CLINICAL SUMMARY MEDICAL DECISION MAKING FREE TEXT BOX
41 yo male presents s/p choking episode earlier today. He successfully received the heimlich maneuver with likely removal of the food bolus. Will check CXR. Monitor and reassess. 41 yo male presents s/p choking episode earlier today. He successfully received the heimlich maneuver with likely removal of the food bolus. Will check CXR. Monitor and reassess.    Progress: XR neck and XR chest negative for acute findings. Patient tolerating PO intake. Spoke w patient's aid regarding return precautions. Aid understood and is comfortable with the plan. 43 yo male presents s/p choking episode earlier today. He successfully received the heimlich maneuver with likely removal of the food bolus. Will check CXR. Monitor and reassess.    Progress: XR neck and XR chest negative for acute findings. Patient tolerating PO intake. Spoke w patient's aid regarding return precautions. Aid understood and is comfortable with the plan. Spoke with [facility] nurse Valle ([phone]) and informed her of the plan.

## 2023-04-19 NOTE — ED PROVIDER NOTE - PHYSICAL EXAMINATION
Gen: Well appearing in NAD  Head: NC/AT  Neck: trachea midline  HEENT: Oropharynx clear, no food bolus visualized in the mouth.  Resp:  No distress, lungs cta b/l  Cardiac: Heart rrr. No murmur.   Abdomen: Soft, nontender, nondistended.   Ext: no deformities  Neuro:  A&O appears non focal  Skin:  Warm and dry as visualized  Psych:  Normal affect and mood

## 2023-04-19 NOTE — ED PROVIDER NOTE - NSFOLLOWUPCLINICS_GEN_ALL_ED_FT
[id number] [place] [address]  Phone: [phone]  Fax:   Follow Up Time: Urgent

## 2023-04-19 NOTE — ED PROVIDER NOTE - NSFOLLOWUPINSTRUCTIONS_ED_ALL_ED_FT
Return to the ER should your symptoms worsen. Return if you cannot swallow foods. Follow up with your primary care physician.

## 2023-04-19 NOTE — ED PROVIDER NOTE - PATIENT PORTAL LINK FT
You can access the FollowMyHealth Patient Portal offered by [facility] by registering at the following website: [email or web address]. By joining [facility]’s FollowMyHealth portal, you will also be able to view your health information using other applications (apps) compatible with our system.

## 2023-04-19 NOTE — ED PROVIDER NOTE - OBJECTIVE STATEMENT
41 yo male history of Down Syndrome presents after choking on Hamburger at [facility]. Per aid, at bedside, patient was choking on his hamburger and received the heimlich maneuver from staff. Patient did spit up the hamburger. He has been tolerating his secretions and presents no complaints. History from patient limited secondary to cognitive impairment.

## 2023-04-19 NOTE — ED ADULT NURSE NOTE - OBJECTIVE STATEMENT
Patient reports to the ED from a day program where he experienced a choking episode. Staff from program states he was shocking for approx 3-5 seconds where he than received the Heimlich maneuver by staff and the food dislodged. pt has hx of down syndrome, able to tell staff yes or no. denies pain at this time. so s/s of pain/ discomfort

## 2023-04-19 NOTE — ED ADULT TRIAGE NOTE - CHIEF COMPLAINT QUOTE
Patient arrives from day program (hx of Down Syndrome, nonverbal at baseline, arrives with day program staff) s/p choking. Patient was eating food and was choking approximately 3-5 seconds as per EMS. Patient received Heimlich maneuver by staff and spit up the food within a few seconds. Patient comfortable, no signs of pain, breathing even & unlabored.

## 2023-12-13 NOTE — ED STATDOCS - ADMIT DISPOSITION PRESENT ON ADMISSION SEPSIS
[email or web address] No

## 2025-07-08 NOTE — ED PROVIDER NOTE - TEMPLATE, MLM
Addended by: AG BROTHERS on: 7/8/2025 11:42 AM     Modules accepted: Orders    
Abdominal Pain, N/V/D